# Patient Record
Sex: MALE | Race: OTHER | NOT HISPANIC OR LATINO
[De-identification: names, ages, dates, MRNs, and addresses within clinical notes are randomized per-mention and may not be internally consistent; named-entity substitution may affect disease eponyms.]

---

## 2019-11-09 ENCOUNTER — TRANSCRIPTION ENCOUNTER (OUTPATIENT)
Age: 24
End: 2019-11-09

## 2019-11-12 ENCOUNTER — TRANSCRIPTION ENCOUNTER (OUTPATIENT)
Age: 24
End: 2019-11-12

## 2019-11-12 ENCOUNTER — INPATIENT (INPATIENT)
Facility: HOSPITAL | Age: 24
LOS: 4 days | Discharge: HOME | DRG: 866 | End: 2019-11-17
Attending: INTERNAL MEDICINE | Admitting: HOSPITALIST
Payer: COMMERCIAL

## 2019-11-12 VITALS
DIASTOLIC BLOOD PRESSURE: 68 MMHG | WEIGHT: 177.91 LBS | SYSTOLIC BLOOD PRESSURE: 112 MMHG | RESPIRATION RATE: 14 BRPM | HEART RATE: 84 BPM | TEMPERATURE: 100 F | OXYGEN SATURATION: 98 %

## 2019-11-12 DIAGNOSIS — D72.819 DECREASED WHITE BLOOD CELL COUNT, UNSPECIFIED: ICD-10-CM

## 2019-11-12 DIAGNOSIS — Z29.9 ENCOUNTER FOR PROPHYLACTIC MEASURES, UNSPECIFIED: ICD-10-CM

## 2019-11-12 DIAGNOSIS — D61.818 OTHER PANCYTOPENIA: ICD-10-CM

## 2019-11-12 DIAGNOSIS — K82.9 DISEASE OF GALLBLADDER, UNSPECIFIED: ICD-10-CM

## 2019-11-12 DIAGNOSIS — K52.9 NONINFECTIVE GASTROENTERITIS AND COLITIS, UNSPECIFIED: ICD-10-CM

## 2019-11-12 DIAGNOSIS — D69.6 THROMBOCYTOPENIA, UNSPECIFIED: ICD-10-CM

## 2019-11-12 LAB
ALBUMIN SERPL ELPH-MCNC: 3.6 G/DL — SIGNIFICANT CHANGE UP (ref 3.3–5)
ALP SERPL-CCNC: 72 U/L — SIGNIFICANT CHANGE UP (ref 40–120)
ALT FLD-CCNC: 70 U/L — SIGNIFICANT CHANGE UP (ref 12–78)
ANION GAP SERPL CALC-SCNC: 7 MMOL/L — SIGNIFICANT CHANGE UP (ref 5–17)
APPEARANCE UR: CLEAR — SIGNIFICANT CHANGE UP
APTT BLD: 51.2 SEC — HIGH (ref 28.5–37)
AST SERPL-CCNC: 131 U/L — HIGH (ref 15–37)
BACTERIA # UR AUTO: ABNORMAL
BASOPHILS # BLD AUTO: 0 K/UL — SIGNIFICANT CHANGE UP (ref 0–0.2)
BASOPHILS NFR BLD AUTO: 0 % — SIGNIFICANT CHANGE UP (ref 0–2)
BILIRUB SERPL-MCNC: 0.7 MG/DL — SIGNIFICANT CHANGE UP (ref 0.2–1.2)
BILIRUB UR-MCNC: NEGATIVE — SIGNIFICANT CHANGE UP
BUN SERPL-MCNC: 10 MG/DL — SIGNIFICANT CHANGE UP (ref 7–23)
CALCIUM SERPL-MCNC: 8.5 MG/DL — SIGNIFICANT CHANGE UP (ref 8.5–10.1)
CHLORIDE SERPL-SCNC: 97 MMOL/L — SIGNIFICANT CHANGE UP (ref 96–108)
CO2 SERPL-SCNC: 30 MMOL/L — SIGNIFICANT CHANGE UP (ref 22–31)
COLOR SPEC: YELLOW — SIGNIFICANT CHANGE UP
CREAT SERPL-MCNC: 0.95 MG/DL — SIGNIFICANT CHANGE UP (ref 0.5–1.3)
DIFF PNL FLD: ABNORMAL
EOSINOPHIL # BLD AUTO: 0 K/UL — SIGNIFICANT CHANGE UP (ref 0–0.5)
EOSINOPHIL NFR BLD AUTO: 0 % — SIGNIFICANT CHANGE UP (ref 0–6)
EPI CELLS # UR: SIGNIFICANT CHANGE UP
ETHANOL SERPL-MCNC: <10 MG/DL — SIGNIFICANT CHANGE UP (ref 0–10)
GLUCOSE SERPL-MCNC: 129 MG/DL — HIGH (ref 70–99)
GLUCOSE UR QL: NEGATIVE — SIGNIFICANT CHANGE UP
HCT VFR BLD CALC: 48.1 % — SIGNIFICANT CHANGE UP (ref 39–50)
HCT VFR BLD CALC: 53.7 % — HIGH (ref 39–50)
HGB BLD-MCNC: 16.7 G/DL — SIGNIFICANT CHANGE UP (ref 13–17)
HGB BLD-MCNC: 19.1 G/DL — CRITICAL HIGH (ref 13–17)
INR BLD: 1.08 RATIO — SIGNIFICANT CHANGE UP (ref 0.88–1.16)
KETONES UR-MCNC: NEGATIVE — SIGNIFICANT CHANGE UP
LACTATE SERPL-SCNC: 1.7 MMOL/L — SIGNIFICANT CHANGE UP (ref 0.7–2)
LEUKOCYTE ESTERASE UR-ACNC: NEGATIVE — SIGNIFICANT CHANGE UP
LIDOCAIN IGE QN: 121 U/L — SIGNIFICANT CHANGE UP (ref 73–393)
LYMPHOCYTES # BLD AUTO: 0.48 K/UL — LOW (ref 1–3.3)
LYMPHOCYTES # BLD AUTO: 15.9 % — SIGNIFICANT CHANGE UP (ref 13–44)
MCHC RBC-ENTMCNC: 30.4 PG — SIGNIFICANT CHANGE UP (ref 27–34)
MCHC RBC-ENTMCNC: 30.6 PG — SIGNIFICANT CHANGE UP (ref 27–34)
MCHC RBC-ENTMCNC: 34.7 GM/DL — SIGNIFICANT CHANGE UP (ref 32–36)
MCHC RBC-ENTMCNC: 35.6 GM/DL — SIGNIFICANT CHANGE UP (ref 32–36)
MCV RBC AUTO: 85.9 FL — SIGNIFICANT CHANGE UP (ref 80–100)
MCV RBC AUTO: 87.5 FL — SIGNIFICANT CHANGE UP (ref 80–100)
MONOCYTES # BLD AUTO: 0.19 K/UL — SIGNIFICANT CHANGE UP (ref 0–0.9)
MONOCYTES NFR BLD AUTO: 6.4 % — SIGNIFICANT CHANGE UP (ref 2–14)
NEUTROPHILS # BLD AUTO: 1.52 K/UL — LOW (ref 1.8–7.4)
NEUTROPHILS NFR BLD AUTO: 44.3 % — SIGNIFICANT CHANGE UP (ref 43–77)
NITRITE UR-MCNC: NEGATIVE — SIGNIFICANT CHANGE UP
NRBC # BLD: SIGNIFICANT CHANGE UP /100 WBCS (ref 0–0)
PH UR: 6.5 — SIGNIFICANT CHANGE UP (ref 5–8)
PLATELET # BLD AUTO: 13 K/UL — CRITICAL LOW (ref 150–400)
PLATELET # BLD AUTO: 13 K/UL — CRITICAL LOW (ref 150–400)
POTASSIUM SERPL-MCNC: 4.1 MMOL/L — SIGNIFICANT CHANGE UP (ref 3.5–5.3)
POTASSIUM SERPL-SCNC: 4.1 MMOL/L — SIGNIFICANT CHANGE UP (ref 3.5–5.3)
PROT SERPL-MCNC: 6.9 G/DL — SIGNIFICANT CHANGE UP (ref 6–8.3)
PROT UR-MCNC: 150 MG/DL
PROTHROM AB SERPL-ACNC: 12.2 SEC — SIGNIFICANT CHANGE UP (ref 10–12.9)
RBC # BLD: 5.5 M/UL — SIGNIFICANT CHANGE UP (ref 4.2–5.8)
RBC # BLD: 6.25 M/UL — HIGH (ref 4.2–5.8)
RBC # FLD: 11.7 % — SIGNIFICANT CHANGE UP (ref 10.3–14.5)
RBC # FLD: 11.7 % — SIGNIFICANT CHANGE UP (ref 10.3–14.5)
RBC CASTS # UR COMP ASSIST: SIGNIFICANT CHANGE UP /HPF (ref 0–4)
SODIUM SERPL-SCNC: 134 MMOL/L — LOW (ref 135–145)
SP GR SPEC: 1.01 — SIGNIFICANT CHANGE UP (ref 1.01–1.02)
UROBILINOGEN FLD QL: NEGATIVE — SIGNIFICANT CHANGE UP
WBC # BLD: 2.79 K/UL — LOW (ref 3.8–10.5)
WBC # BLD: 3 K/UL — LOW (ref 3.8–10.5)
WBC # FLD AUTO: 2.79 K/UL — LOW (ref 3.8–10.5)
WBC # FLD AUTO: 3 K/UL — LOW (ref 3.8–10.5)
WBC UR QL: SIGNIFICANT CHANGE UP

## 2019-11-12 PROCEDURE — 99285 EMERGENCY DEPT VISIT HI MDM: CPT

## 2019-11-12 PROCEDURE — 93010 ELECTROCARDIOGRAM REPORT: CPT

## 2019-11-12 PROCEDURE — 70450 CT HEAD/BRAIN W/O DYE: CPT | Mod: 26

## 2019-11-12 PROCEDURE — 99406 BEHAV CHNG SMOKING 3-10 MIN: CPT

## 2019-11-12 PROCEDURE — 99223 1ST HOSP IP/OBS HIGH 75: CPT | Mod: GC

## 2019-11-12 PROCEDURE — 99223 1ST HOSP IP/OBS HIGH 75: CPT

## 2019-11-12 PROCEDURE — 74177 CT ABD & PELVIS W/CONTRAST: CPT | Mod: 26

## 2019-11-12 PROCEDURE — 76705 ECHO EXAM OF ABDOMEN: CPT | Mod: 26

## 2019-11-12 RX ORDER — SENNA PLUS 8.6 MG/1
2 TABLET ORAL AT BEDTIME
Refills: 0 | Status: DISCONTINUED | OUTPATIENT
Start: 2019-11-12 | End: 2019-11-17

## 2019-11-12 RX ORDER — PIPERACILLIN AND TAZOBACTAM 4; .5 G/20ML; G/20ML
3.38 INJECTION, POWDER, LYOPHILIZED, FOR SOLUTION INTRAVENOUS ONCE
Refills: 0 | Status: COMPLETED | OUTPATIENT
Start: 2019-11-12 | End: 2019-11-12

## 2019-11-12 RX ORDER — ACETAMINOPHEN 500 MG
650 TABLET ORAL ONCE
Refills: 0 | Status: COMPLETED | OUTPATIENT
Start: 2019-11-12 | End: 2019-11-12

## 2019-11-12 RX ORDER — SODIUM CHLORIDE 9 MG/ML
1000 INJECTION INTRAMUSCULAR; INTRAVENOUS; SUBCUTANEOUS ONCE
Refills: 0 | Status: COMPLETED | OUTPATIENT
Start: 2019-11-12 | End: 2019-11-12

## 2019-11-12 RX ORDER — ACETAMINOPHEN 500 MG
650 TABLET ORAL EVERY 6 HOURS
Refills: 0 | Status: DISCONTINUED | OUTPATIENT
Start: 2019-11-12 | End: 2019-11-17

## 2019-11-12 RX ORDER — IOHEXOL 300 MG/ML
30 INJECTION, SOLUTION INTRAVENOUS ONCE
Refills: 0 | Status: COMPLETED | OUTPATIENT
Start: 2019-11-12 | End: 2019-11-12

## 2019-11-12 RX ORDER — PANTOPRAZOLE SODIUM 20 MG/1
40 TABLET, DELAYED RELEASE ORAL ONCE
Refills: 0 | Status: COMPLETED | OUTPATIENT
Start: 2019-11-12 | End: 2019-11-12

## 2019-11-12 RX ORDER — PIPERACILLIN AND TAZOBACTAM 4; .5 G/20ML; G/20ML
3.38 INJECTION, POWDER, LYOPHILIZED, FOR SOLUTION INTRAVENOUS EVERY 8 HOURS
Refills: 0 | Status: DISCONTINUED | OUTPATIENT
Start: 2019-11-12 | End: 2019-11-13

## 2019-11-12 RX ORDER — FAMOTIDINE 10 MG/ML
20 INJECTION INTRAVENOUS ONCE
Refills: 0 | Status: COMPLETED | OUTPATIENT
Start: 2019-11-12 | End: 2019-11-12

## 2019-11-12 RX ORDER — ONDANSETRON 8 MG/1
4 TABLET, FILM COATED ORAL ONCE
Refills: 0 | Status: COMPLETED | OUTPATIENT
Start: 2019-11-12 | End: 2019-11-12

## 2019-11-12 RX ADMIN — IOHEXOL 30 MILLILITER(S): 300 INJECTION, SOLUTION INTRAVENOUS at 15:58

## 2019-11-12 RX ADMIN — SODIUM CHLORIDE 1000 MILLILITER(S): 9 INJECTION INTRAMUSCULAR; INTRAVENOUS; SUBCUTANEOUS at 15:20

## 2019-11-12 RX ADMIN — SODIUM CHLORIDE 1000 MILLILITER(S): 9 INJECTION INTRAMUSCULAR; INTRAVENOUS; SUBCUTANEOUS at 15:55

## 2019-11-12 RX ADMIN — ONDANSETRON 4 MILLIGRAM(S): 8 TABLET, FILM COATED ORAL at 15:23

## 2019-11-12 RX ADMIN — Medication 650 MILLIGRAM(S): at 20:51

## 2019-11-12 RX ADMIN — PANTOPRAZOLE SODIUM 40 MILLIGRAM(S): 20 TABLET, DELAYED RELEASE ORAL at 16:34

## 2019-11-12 RX ADMIN — Medication 650 MILLIGRAM(S): at 21:35

## 2019-11-12 RX ADMIN — SODIUM CHLORIDE 1000 MILLILITER(S): 9 INJECTION INTRAMUSCULAR; INTRAVENOUS; SUBCUTANEOUS at 18:00

## 2019-11-12 RX ADMIN — FAMOTIDINE 20 MILLIGRAM(S): 10 INJECTION INTRAVENOUS at 15:23

## 2019-11-12 RX ADMIN — PIPERACILLIN AND TAZOBACTAM 200 GRAM(S): 4; .5 INJECTION, POWDER, LYOPHILIZED, FOR SOLUTION INTRAVENOUS at 20:40

## 2019-11-12 NOTE — H&P ADULT - NSHPPHYSICALEXAM_GEN_ALL_CORE
T(C): 38 (11-12-19 @ 20:25), Max: 38 (11-12-19 @ 20:25)  HR: 76 (11-12-19 @ 20:25) (60 - 84)  BP: 103/69 (11-12-19 @ 20:25) (103/69 - 118/74)  RR: 14 (11-12-19 @ 20:25) (14 - 14)  SpO2: 96% (11-12-19 @ 20:25) (96% - 100%)    GENERAL: patient appears well, no acute distress, appropriate, pleasant  EYES: sclera clear, no exudates  ENMT: oropharynx clear without erythema, no exudates, moist mucous membranes  NECK: supple, soft, no thyromegaly noted  LUNGS: good air entry bilaterally, clear to auscultation, symmetric breath sounds, no wheezing or rhonchi appreciated  HEART: soft S1/S2, regular rate and rhythm, no murmurs noted, no lower extremity edema  GASTROINTESTINAL: abdomen is soft, TTP RUQ, + Snider's sign, nondistended, normoactive bowel sounds, no palpable masses, clustered petechiae over right epigastrium  INTEGUMENT: good skin turgor, petechiae noted on L antecubital fossa and right epigastrium, healing mosquito bites on b/l ankles  MUSCULOSKELETAL: no clubbing or cyanosis, no obvious deformity  NEUROLOGIC: awake, alert, oriented x3, good muscle tone in 4 extremities, no obvious sensory deficits  PSYCHIATRIC: mood is good, affect is congruent, linear and logical thought process  HEME/LYMPH: no palpable cervical/supraclavicular nodules, no obvious ecchymosis or petechiae

## 2019-11-12 NOTE — ED PROVIDER NOTE - OBJECTIVE STATEMENT
Pt is a 25 yo male with no pmhx c/o of abdominal nvd fever for a few days, sent from urgent care. Patient states he had recent travel to [facility] one week ago where he had some diarrhea, on Saturday patient felt fever, chills, tmax 103F, having nausea and vomiting with loose stool, today noted blood streaks in the vomiting no recent sick contacts chest pain sob.

## 2019-11-12 NOTE — H&P ADULT - ATTENDING COMMENTS
I have personally seen and examined patient on the above date.  I discussed the case with MD Isaacs and I agree with findings and plan as detailed per note above, which I have amended where appropriate.

## 2019-11-12 NOTE — CONSULT NOTE ADULT - SUBJECTIVE AND OBJECTIVE BOX
HPI:  24 year old male with no significant PMH or PSH presents to ED complaining of constant 7-9/10 RUQ abdominal pain x 1day, on and off fevers to 104F (max) x 5days, nausea, vomiting after eating 3x yesterday, 1x today with streaks of blood, nonbloody diarrhea 2x yesterday, 1x today, as well as  headache and mild petechial rash on left forearm and chest.  Patient recently returned from a trip to [facility] 9 days ago, family members who were with him w/o complaints, denies sick contacts.  Patient went to urgent care facility 3 days ago, recommended alternating motrin 600mg q6, tylenol 1000mg q6, had negative flu and strep tests.  Patient denies chest pain, shortness of breath, palpitations, cough, wheezing, melena, hematochezia, or urinary complaints.    PAST MEDICAL & SURGICAL HISTORY:  No pertinent past medical history  No significant past surgical history    REVIEW OF SYSTEMS:  CONSTITUTIONAL: See HPI.  EYES/ENT: No visual changes;  No vertigo or throat pain.  NECK: No pain or stiffness  RESPIRATORY: No cough, wheezing, hemoptysis; No shortness of breath  CARDIOVASCULAR: No chest pain or palpitations  GASTROINTESTINAL: See HPI. No constipation. No melena or hematochezia.  GENITOURINARY: No dysuria, frequency or hematuria  NEUROLOGICAL: No numbness or weakness  SKIN: See HPI. No itching, burning, or lesions   All other review of systems is negative unless indicated above.    MEDICATIONS  (STANDING):  multivitamin 1 Tablet(s) Oral daily  piperacillin/tazobactam IVPB.. 3.375 Gram(s) IV Intermittent every 8 hours    MEDICATIONS  (PRN):  acetaminophen   Tablet .. 650 milliGRAM(s) Oral every 6 hours PRN Temp greater or equal to 38C (100.4F), Mild Pain (1 - 3)  senna 2 Tablet(s) Oral at bedtime PRN Constipation    Allergies  No Known Allergies    SOCIAL HISTORY: 1pack/2weeks cigarette smoker.  Social ETOH.  No illicit drug use.    FAMILY HISTORY:  Nonconstributory    Vital Signs Last 24 Hrs  T(C): 37.7 (2019 21:35), Max: 38 (2019 20:25)  T(F): 99.8 (2019 21:35), Max: 100.4 (2019 20:25)  HR: 71 (2019 21:35) (60 - 84)  BP: 102/68 (2019 21:35) (102/68 - 118/74)  BP(mean): --  RR: 14 (2019 21:35) (14 - 14)  SpO2: 96% (2019 21:35) (96% - 100%)    PHYSICAL EXAM  GENERAL:  Well-nourished, well-developed male sitting comfortably in bed in NAD.  HEAD:  Normocephalic, atraumatic  CARDIO:  Regular rate and rhythm.  No murmur, gallop or rub appreciated.  +Petechial rash noted on chest.  RESPIRATORY:  Clear to auscultation bilaterally.  No wheezing, rales or rhonchi appreciated.  ABDOMEN:  Soft, nondistended, +TTP in RUQ, BS appreciated on auscultation.  No rebound tenderness or guarding.  EXTREMITIES: No calf tenderness.  +Petechial rash on left forearm  NEURO:  A&O x 3    LABS:                        16.7   2.79  )-----------( 13       ( 2019 16:57 )             48.1         134<L>  |  97  |  10  ----------------------------<  129<H>  4.1   |  30  |  0.95    Ca    8.5      2019 15:36    TPro  6.9  /  Alb  3.6  /  TBili  0.7  /  DBili  x   /  AST  131<H>  /  ALT  70  /  AlkPhos  72  -12    PT/INR - ( 2019 20:12 )   PT: 12.2 sec;   INR: 1.08 ratio  PTT - ( 2019 20:12 )  PTT:51.2 sec    Urinalysis Basic - ( 2019 16:46 )  Color: Yellow / Appearance: Clear / S.010 / pH: x  Gluc: x / Ketone: Negative  / Bili: Negative / Urobili: Negative   Blood: x / Protein: 150 mg/dL / Nitrite: Negative   Leuk Esterase: Negative / RBC: 0-2 /HPF / WBC 0-2   Sq Epi: x / Non Sq Epi: Occasional / Bacteria: Occasional    RADIOLOGY & ADDITIONAL STUDIES:  < from: CT Abdomen and Pelvis w/ Oral Cont and w/ IV Cont (19 @ 18:33) >  FINDINGS:    The lower chest is unremarkable.    The liver is unremarkable. The fluid-filled gallbladder is remarkable for   severe gallbladder wall thickening.    The spleen, pancreas and adrenal glands are unremarkable.The kidneys are   unremarkable. The fluid-filled bladder appears intact.    There is no bowel obstruction. A normal appendix is seen.    There is no intraperitoneal free air.  There is a small-to-moderate   amount of abdominal and pelvic ascites . The aorta is not aneurysmal.    There is no significant abdominal, retroperitoneal or pelvic   lymphadenopathy. The pelvic structures are unremarkable.    The osseous structures are unremarkable.    IMPRESSION:    Small to moderate amount of ascites  Gallbladder wall thickening which may be secondary to ascites      < from: US Gallbladder (19 @ 15:50) >  Gallbladder is calculi. There is a marked edematous thickening of the   gallbladder wall and pericholecystic fluid. This is a nonspecific finding   may be reactive secondary to liver disease or hypoalbuminemia. If there   is suspicion for cholecystitis, HIDA scan can be obtained. No biliary   dilatation. Common duct 0.3 cm.    Impression:    Nonspecific edematous gallbladder wall and pericholecystic fluid as   discussed.

## 2019-11-12 NOTE — ED PROVIDER NOTE - PROGRESS NOTE DETAILS
labs wbc 7 platelet 13 repeated same results ct and us edematous gallbladder spoke with GI Dr. Hirsch spoke with GI Dr. Sutherland advised abx and heme onc will admit to medicine

## 2019-11-12 NOTE — H&P ADULT - ASSESSMENT
25yo M, with no significant PMH, c/o of recurring fever and abdominal pain a/w diarrhea, nausea, vomiting with streaks of blood, petechiae, myalgia, and arthralgia after returning from [facility], admitted for thrombocytopenia and possible gastroenteritis. 25yo M, with no significant PMH, c/o of recurring fever and abdominal pain a/w diarrhea, nausea, vomiting with streaks of blood, petechiae, myalgia, and arthralgia after returning from [facility], admitted for thrombocytopenia and possible gastroenteritis.    #tobacco abuse  - cessation advised 5 minutes of counselling spent

## 2019-11-12 NOTE — ED ADULT NURSE NOTE - OBJECTIVE STATEMENT
Pt A&Ox4, ambulatory to ED c/o abdominal pain and fever.  Pt states he had a fever this past Sunday and went to urgent care where he had negative flu and strep throat tests and was told to take Tylenol.  Pt states that yesterday he developed nausea and vomiting and now has abdominal pain in mid-abdomen/epigastric area.  Pt returned to urgent care but was sent to ED for eval.

## 2019-11-12 NOTE — H&P ADULT - PROBLEM SELECTOR PLAN 3
- Pt with nausea/vomiting and diarrhea  - Maintain adequate IV hydration  - Start clear liquid diet, advance as tolerated  - Monitor stool output - Pt with nausea/vomiting and diarrhea  - S/p protonix, pepcid, and 1x zofran in the ED  - Continue anti-emetic q8h prn  - S/p 2L NS, maintain adequate IV hydration  - Start clear liquid diet, advance as tolerated  - Monitor stool output  - Monitor fever curve, Tylenol 650mg q6h prn for fever - Pt with nausea/vomiting and diarrhea  - S/p protonix, pepcid, and 1x zofran in the ED  - Continue anti-emetic q8h prn  - S/p 2L NS, maintain adequate IV hydration  - npo w/ ivf  - Monitor stool output  - Monitor fever curve, Tylenol 650mg q6h prn for fever  - gi consult pending

## 2019-11-12 NOTE — ED PROVIDER NOTE - CLINICAL SUMMARY MEDICAL DECISION MAKING FREE TEXT BOX
Pt is a 23 yo male with abdominal pain nvd fever will get labs us ruq ct scan give fluids gi cocktail

## 2019-11-12 NOTE — H&P ADULT - PROBLEM SELECTOR PLAN 2
- monitor vitals and wbc   - may be secondary to dengue vs hus vs other mosquito born illnesses vs travelers diarrhea vs itp vs ttp vs other hematological concerns

## 2019-11-12 NOTE — CONSULT NOTE ADULT - ASSESSMENT
24 year old male with no significant PMH or PSH with RUQ pain, fever (Tmax in ED 104F), diarrhea, hematemesis, likely gastroenteritis.  Leukopenia (2.7) thrombocytopenia (13) noted.    - No emergent surgical intervention at this time  - HIDA ordered.  Possible cholecystostomy  - Keep NPO with IV fluids for now  - Continue IV zosyn  - Trend WBC, LFTs, Tbili  - Monitor and replete electrolytes PRN  - Heme/onc consult for thrombocytopenia/leukopenia  - GI PCR, c. diff, stool culture, ova&parasites, GI consult for management of gastroenteritis  - Pain control, zofran PRN  - DVT prophylaxis with SCDs  - Encourage ambulation  - Case discussed with Dr. Sutherland 24 year old male with no significant PMH or PSH with RUQ pain, fever (Tmax in ED 104F), diarrhea, hematemesis, likely gastroenteritis, r/o vector-borne diseases.  Leukopenia (2.7) thrombocytopenia (13) noted.    - No emergent surgical intervention at this time  - HIDA ordered.  Possible cholecystostomy  - Keep NPO with IV fluids for now  - Continue IV zosyn  - Trend WBC, LFTs, Tbili  - Monitor and replete electrolytes PRN  - Heme/onc consult for thrombocytopenia/leukopenia  - GI PCR, c. diff, stool culture, ova&parasites, GI consult for management of gastroenteritis  - Pain control, zofran PRN  - DVT prophylaxis with SCDs  - Encourage ambulation  - Case discussed with Dr. Sutherland

## 2019-11-12 NOTE — ED ADULT NURSE NOTE - CHPI ED NUR SYMPTOMS POS
VOMITING/DECREASED EATING/DRINKING/NAUSEA/TENDERNESS/FEVER/PAIN VOMITING/DIARRHEA/FEVER/PAIN/TENDERNESS/DECREASED EATING/DRINKING/NAUSEA

## 2019-11-12 NOTE — H&P ADULT - HISTORY OF PRESENT ILLNESS
CHARTING IN PROGRESS    From ED Provider notes:          In the ED  VS T 99.7 now 100.4 /68 now 103/69 HR 84 RR 14 SpO2 96%  Significant labs include: WBC 2.79, Platelets 13, Na 134.  CT head: no evidence of acute intracranial hemorrhage  CT a/p: severe gallbladder thickening  EKG:    Received 2L NS, Protonix, Pepcid, Zofran, 1x Zosyn. CHARTING IN PROGRESS    From ED Provider note:    Pt is a 25 yo male with no pmhx c/o of abdominal nvd fever for a few days, sent from urgent care. Patient states he had recent travel to [facility] one week ago where he had some diarrhea, on Saturday patient felt fever, chills, tmax 103F, having nausea and vomiting with loose stool, today noted blood streaks in the vomiting no recent sick contacts chest pain sob.      In the ED  VS T 99.7 now 100.4 /68 now 103/69 HR 84 RR 14 SpO2 96%  Significant labs include: WBC 2.79, Platelets 13, Na 134.  CT head: no evidence of acute intracranial hemorrhage  CT a/p: severe gallbladder thickening  EKG:    Received 2L NS, Protonix, Pepcid, Zofran, 1x Zosyn. CHARTING IN PROGRESS    From ED Provider note:    Pt is a 23 yo male with no pmhx c/o of abdominal nvd fever for a few days, sent from urgent care. Patient states he had recent travel to [facility] one week ago where he had some diarrhea, on Saturday patient felt fever, chills, tmax 103F, having nausea and vomiting with loose stool, today noted blood streaks in the vomiting no recent sick contacts chest pain sob.      In the ED  VS T 99.7 now 100.4 /68 now 103/69 HR 84 RR 14 SpO2 96%  Significant labs include: WBC 2.79, Platelets 13, Na 134, .  CT head: no evidence of acute intracranial hemorrhage  CT a/p: severe gallbladder thickening  EKG:    Received 2L NS, Protonix, Pepcid, Zofran, 1x Zosyn. 23yo M, with no significant PMh, c/o 5 day duration of fever and abdominal pain that developed yesterday. Pt states he returned from [facility] on 11/2 where he spent nine days. During this time he was bitten by mosquitoes multiple times. He traveled with family, and none of his travel companions are complaining of similar symptoms. Per pt, fevers started on Friday 11/8. Tmax at home 103-104. The following day he visited urgent care who advised alternating 2 tabs of double-strength Tylenol with 3 tabs of 200mg Motrin every few hours. Fever would respond to therapy but always recurred. Yesterday, pt developed abdominal pain mostly in the right upper quadrant that is associated with nausea, vomiting, and nonbloody diarrhea. Today, pt noted streaks of blood in his vomit and has been unable to keep solids and fluids down. Pt states he received pre-travel prophylactic care including MMRV and TB at an urgent care but can't remember if he received anything else. Endorses b/l knee pain, calf pain, scattered rash, but denies chest pain, palpitations, SOB, cough, or urinary symptoms.    In the ED  VS T 99.7 now 100.4 /68 now 103/69 HR 84 RR 14 SpO2 96%  Significant labs include: WBC 2.79, Platelets 13, Na 134, .  CT head: no evidence of acute intracranial hemorrhage  CT a/p: severe gallbladder thickening and mild-mod abdominal and pelvic ascites  US gallbladder: nonspecific edematous gallbladder with pericholecystic fluid    Received 2L NS, Protonix, Pepcid, Zofran, 1x Zosyn, Tylenol 650mg.

## 2019-11-12 NOTE — H&P ADULT - NSHPREVIEWOFSYSTEMS_GEN_ALL_CORE
CONSTITUTIONAL: admits fever, chills, fatigue, weakness  HEENT: denies blurred vision, sore throat  SKIN: admits mild petechial rash  CARDIOVASCULAR: denies chest pain, chest pressure, palpitations  RESPIRATORY: denies shortness of breath, sputum production  GASTROINTESTINAL: admits nausea, vomiting, diarrhea, abdominal pain  GENITOURINARY: denies dysuria, discharge  NEUROLOGICAL: admits headache w/ abd w/o ocular movement; denies numbness/tingling, focal weakness  MUSCULOSKELETAL: admits b/l knee pain, myalgia  HEMATOLOGIC: denies gross bleeding, bruising  LYMPHATICS: denies enlarged lymph nodes, extremity swelling

## 2019-11-12 NOTE — H&P ADULT - PROBLEM SELECTOR PLAN 1
- Severe thromobocytopenia, platelets 13  - CT head with no evidence of spontaneous intracranial hemorrhage  - Consult heme/onc, f/u recs - Severe thrombocytopenia, platelets 13  - Admit to [facility]  - CT head with no evidence of spontaneous intracranial hemorrhage  - Consult heme/onc, f/u recs - Severe thrombocytopenia, platelets 13  - may be secondary to dengue vs hus vs other mosquito born illnesses vs travelers diarrhea vs itp vs ttp vs other hematological concerns   - Admit to [facility]  - CT head with no evidence of spontaneous intracranial hemorrhage  - Consult heme/onc, f/u recs  - hold platelet transfusion for now as no active signs of bleeding   - hold steroid dosing for now as well   - if patient positive for ttp may need ivig vs plasmapheresis

## 2019-11-12 NOTE — ED PROVIDER NOTE - ATTENDING CONTRIBUTION TO CARE
I have personally performed a face to face diagnostic evaluation on this patient.  I have reviewed the PA note and agree with the history, exam, and plan of care, except as noted.  History and Exam by me shows patient sent to ER from urgent care for evaluation of nausea, vomiting, diarrhea and abdominal pain. Patient states he had recent travel to [facility] one week ago where he had some diarrhea, on Saturday patient felt fever, chills, tmax 103F, having nausea and vomiting with loose stool, today noted blood streaks in the vomiting, patient alert and oriented, heart and lungs clear, (+)tender epigatstric and RUQ, no RLQ or LLQ tenderness, f/u labs, iv fluids, anti-emetics, pepcid, protonix, US gallbladder, ct abdomen/pelvis, re-eval.

## 2019-11-12 NOTE — H&P ADULT - NSHPSOCIALHISTORY_GEN_ALL_CORE
Lives at home with wife  Ambulates independently  Performs ADLs independently  Admits cigarette smoking, approx 1 pack over two weeks  Admits occasional social EtOH consumption  Denies rec drug use  Reportedly received this season's flu vaccine

## 2019-11-12 NOTE — H&P ADULT - PROBLEM SELECTOR PLAN 4
- Pt with RUQ pain  - Severe gallbladder thickening and pericholecystic fluid noted on US and CT though may be reactive to liver disease  - AST elevated  - Monitor daily liver function - Pt with RUQ pain  - Severe gallbladder thickening and pericholecystic fluid noted on US and CT though may be reactive to liver disease  - S/p 1x Zosyn in the ED  - AST elevated, monitor daily liver function - Pt with RUQ pain  - Severe gallbladder thickening and pericholecystic fluid noted on US and CT though may be reactive to liver disease  - surgery consult pending   - npo  - S/p 1x Zosyn in the ED- continue  - AST elevated, monitor daily liver function

## 2019-11-12 NOTE — H&P ADULT - PROBLEM SELECTOR PLAN 5
IMPROVE VTE Individual Risk Assessment          RISK                                                          Points  [  ] Previous VTE                                                3  [  ] Thrombophilia                                             2  [  ] Lower limb paralysis                                   2        (unable to hold up >15 seconds)    [  ] Current Cancer                                             2         (within 6 months)  [  ] Immobilization > 24 hrs                              1  [  ] ICU/CCU stay > 24 hours                             1  [  ] Age > 60                                                         1    IMPROVE VTE Score: 0    - SCDs, ambulate as tolerated, no pharm ppx in setting of thrombocytopenia IMPROVE VTE Individual Risk Assessment        RISK                                                          Points  [  ] Previous VTE                                                3  [  ] Thrombophilia                                             2  [  ] Lower limb paralysis                                   2        (unable to hold up >15 seconds)    [  ] Current Cancer                                             2         (within 6 months)  [  ] Immobilization > 24 hrs                              1  [  ] ICU/CCU stay > 24 hours                             1  [  ] Age > 60                                                         1    IMPROVE VTE Score: 0    - SCDs, ambulate as tolerated, no pharm ppx in setting of thrombocytopenia

## 2019-11-12 NOTE — ED PROVIDER NOTE - CARE PLAN
Principal Discharge DX:	Gastroenteritis Principal Discharge DX:	Pancytopenia  Secondary Diagnosis:	Gastroenteritis  Secondary Diagnosis:	Gallbladder problem

## 2019-11-13 LAB
ALBUMIN SERPL ELPH-MCNC: 3 G/DL — LOW (ref 3.3–5)
ALP SERPL-CCNC: 60 U/L — SIGNIFICANT CHANGE UP (ref 40–120)
ALT FLD-CCNC: 67 U/L — SIGNIFICANT CHANGE UP (ref 12–78)
ANION GAP SERPL CALC-SCNC: 5 MMOL/L — SIGNIFICANT CHANGE UP (ref 5–17)
APAP SERPL-MCNC: 3 UG/ML — LOW (ref 10–30)
AST SERPL-CCNC: 110 U/L — HIGH (ref 15–37)
BASOPHILS # BLD AUTO: 0 K/UL — SIGNIFICANT CHANGE UP (ref 0–0.2)
BASOPHILS NFR BLD AUTO: 0 % — SIGNIFICANT CHANGE UP (ref 0–2)
BILIRUB SERPL-MCNC: 0.7 MG/DL — SIGNIFICANT CHANGE UP (ref 0.2–1.2)
BUN SERPL-MCNC: 8 MG/DL — SIGNIFICANT CHANGE UP (ref 7–23)
C DIFF BY PCR RESULT: SIGNIFICANT CHANGE UP
C DIFF TOX GENS STL QL NAA+PROBE: SIGNIFICANT CHANGE UP
CALCIUM SERPL-MCNC: 8.1 MG/DL — LOW (ref 8.5–10.1)
CHLORIDE SERPL-SCNC: 102 MMOL/L — SIGNIFICANT CHANGE UP (ref 96–108)
CO2 SERPL-SCNC: 31 MMOL/L — SIGNIFICANT CHANGE UP (ref 22–31)
CREAT SERPL-MCNC: 0.99 MG/DL — SIGNIFICANT CHANGE UP (ref 0.5–1.3)
CULTURE RESULTS: SIGNIFICANT CHANGE UP
EOSINOPHIL # BLD AUTO: 0.04 K/UL — SIGNIFICANT CHANGE UP (ref 0–0.5)
EOSINOPHIL NFR BLD AUTO: 1 % — SIGNIFICANT CHANGE UP (ref 0–6)
FOLATE SERPL-MCNC: >20 NG/ML — SIGNIFICANT CHANGE UP
GLUCOSE SERPL-MCNC: 93 MG/DL — SIGNIFICANT CHANGE UP (ref 70–99)
HAV IGG SER QL IA: REACTIVE
HAV IGM SER-ACNC: SIGNIFICANT CHANGE UP
HCT VFR BLD CALC: 46.4 % — SIGNIFICANT CHANGE UP (ref 39–50)
HGB BLD-MCNC: 16.4 G/DL — SIGNIFICANT CHANGE UP (ref 13–17)
LYMPHOCYTES # BLD AUTO: 2.67 K/UL — SIGNIFICANT CHANGE UP (ref 1–3.3)
LYMPHOCYTES # BLD AUTO: 65 % — HIGH (ref 13–44)
MCHC RBC-ENTMCNC: 30.7 PG — SIGNIFICANT CHANGE UP (ref 27–34)
MCHC RBC-ENTMCNC: 35.3 GM/DL — SIGNIFICANT CHANGE UP (ref 32–36)
MCV RBC AUTO: 86.7 FL — SIGNIFICANT CHANGE UP (ref 80–100)
MONOCYTES # BLD AUTO: 0.66 K/UL — SIGNIFICANT CHANGE UP (ref 0–0.9)
MONOCYTES NFR BLD AUTO: 16 % — HIGH (ref 2–14)
NEUTROPHILS # BLD AUTO: 0.7 K/UL — LOW (ref 1.8–7.4)
NEUTROPHILS NFR BLD AUTO: 13 % — LOW (ref 43–77)
NRBC # BLD: SIGNIFICANT CHANGE UP /100 WBCS (ref 0–0)
PCP SPEC-MCNC: SIGNIFICANT CHANGE UP
PLATELET # BLD AUTO: 15 K/UL — CRITICAL LOW (ref 150–400)
POTASSIUM SERPL-MCNC: 4.3 MMOL/L — SIGNIFICANT CHANGE UP (ref 3.5–5.3)
POTASSIUM SERPL-SCNC: 4.3 MMOL/L — SIGNIFICANT CHANGE UP (ref 3.5–5.3)
PROT SERPL-MCNC: 5.7 G/DL — LOW (ref 6–8.3)
RBC # BLD: 5.35 M/UL — SIGNIFICANT CHANGE UP (ref 4.2–5.8)
RBC # FLD: 11.8 % — SIGNIFICANT CHANGE UP (ref 10.3–14.5)
SODIUM SERPL-SCNC: 138 MMOL/L — SIGNIFICANT CHANGE UP (ref 135–145)
SPECIMEN SOURCE: SIGNIFICANT CHANGE UP
VIT B12 SERPL-MCNC: 512 PG/ML — SIGNIFICANT CHANGE UP (ref 232–1245)
WBC # BLD: 4.1 K/UL — SIGNIFICANT CHANGE UP (ref 3.8–10.5)
WBC # FLD AUTO: 4.1 K/UL — SIGNIFICANT CHANGE UP (ref 3.8–10.5)

## 2019-11-13 PROCEDURE — 78226 HEPATOBILIARY SYSTEM IMAGING: CPT | Mod: 26

## 2019-11-13 PROCEDURE — 99232 SBSQ HOSP IP/OBS MODERATE 35: CPT

## 2019-11-13 PROCEDURE — 99233 SBSQ HOSP IP/OBS HIGH 50: CPT

## 2019-11-13 RX ORDER — INFLUENZA VIRUS VACCINE 15; 15; 15; 15 UG/.5ML; UG/.5ML; UG/.5ML; UG/.5ML
0.5 SUSPENSION INTRAMUSCULAR ONCE
Refills: 0 | Status: DISCONTINUED | OUTPATIENT
Start: 2019-11-13 | End: 2019-11-17

## 2019-11-13 RX ORDER — CEFTRIAXONE 500 MG/1
2000 INJECTION, POWDER, FOR SOLUTION INTRAMUSCULAR; INTRAVENOUS EVERY 24 HOURS
Refills: 0 | Status: DISCONTINUED | OUTPATIENT
Start: 2019-11-13 | End: 2019-11-17

## 2019-11-13 RX ORDER — SODIUM CHLORIDE 9 MG/ML
1000 INJECTION, SOLUTION INTRAVENOUS
Refills: 0 | Status: DISCONTINUED | OUTPATIENT
Start: 2019-11-13 | End: 2019-11-14

## 2019-11-13 RX ORDER — PREGABALIN 225 MG/1
1000 CAPSULE ORAL DAILY
Refills: 0 | Status: DISCONTINUED | OUTPATIENT
Start: 2019-11-13 | End: 2019-11-17

## 2019-11-13 RX ORDER — SODIUM CHLORIDE 9 MG/ML
2000 INJECTION, SOLUTION INTRAVENOUS
Refills: 0 | Status: DISCONTINUED | OUTPATIENT
Start: 2019-11-13 | End: 2019-11-13

## 2019-11-13 RX ORDER — ONDANSETRON 8 MG/1
4 TABLET, FILM COATED ORAL EVERY 8 HOURS
Refills: 0 | Status: DISCONTINUED | OUTPATIENT
Start: 2019-11-13 | End: 2019-11-17

## 2019-11-13 RX ADMIN — SODIUM CHLORIDE 150 MILLILITER(S): 9 INJECTION, SOLUTION INTRAVENOUS at 10:56

## 2019-11-13 RX ADMIN — PIPERACILLIN AND TAZOBACTAM 25 GRAM(S): 4; .5 INJECTION, POWDER, LYOPHILIZED, FOR SOLUTION INTRAVENOUS at 05:22

## 2019-11-13 RX ADMIN — CEFTRIAXONE 100 MILLIGRAM(S): 500 INJECTION, POWDER, FOR SOLUTION INTRAMUSCULAR; INTRAVENOUS at 14:18

## 2019-11-13 RX ADMIN — Medication 1 TABLET(S): at 12:48

## 2019-11-13 RX ADMIN — PREGABALIN 1000 MICROGRAM(S): 225 CAPSULE ORAL at 12:48

## 2019-11-13 NOTE — ED ADULT NURSE REASSESSMENT NOTE - NS ED NURSE REASSESS COMMENT FT1
Dr. Elias in to speak with pt re: test results and need for admission
Patient resting comfortably in bed with family member at bedside. VSS. Rates RUQ abdominal pain 4/10 at this time. Assisted patient with basic daily hygiene. Patient remains on contact precautions to r/o C.diff at this time. Call bell at bedside.  RN will continue to monitor.
Patient transported to [facility] via wheelchair.
patient's IV on the left antecubital removed as patient complained of pain at the IV site and wanted it removed
Report received from Fallon MOREIRA, care assumed at 1100. Patient awake, alert and oriented. Patient states he still has abdominal discomfort. States he is still having diarrhea. PIV intact. Plan of care discussed with patient. Comfort and safety maintained. Will continue to monitor.  Contact isolation maintained.

## 2019-11-13 NOTE — PATIENT PROFILE ADULT - STATED REASON FOR ADMISSION
"Had fever since friday then I started to have abdominal pain and I went to urgent care this morning they told me I had to come to ER because I need ultrasound and CT. My abdomen was swollen"

## 2019-11-13 NOTE — PROGRESS NOTE ADULT - SUBJECTIVE AND OBJECTIVE BOX
Patient is a 24y old  Male who presents with a chief complaint of fevers (2019 11:33)      INTERVAL HPI: Pt seen and examined bedside, has no complaints. Pt has no fever, N/v CP or SOB. has epigastric tenderness.  OVERNIGHT EVENTS:  T(C): 37.1 (19 @ 16:10), Max: 38 (19 @ 20:25)  HR: 63 (19 @ 16:10) (63 - 76)  BP: 96/63 (19 @ 16:10) (96/63 - 112/58)  RR: 16 (19 @ 16:10) (14 - 16)  SpO2: 100% (19 @ 16:10) (95% - 100%)  Wt(kg): --  I&O's Summary    2019 07:01  -  2019 07:00  --------------------------------------------------------  IN: 2000 mL / OUT: 0 mL / NET: 2000 mL        Review of System:    REVIEW OF SYSTEMS  Constitutional: No fever, chills, fatigue  Neuro: No headache, numbness, weakness  Resp: No cough, wheezing, shortness of breath  CVS: No chest pain, palpitations, leg swelling  GI: +epigastric pain, no nausea, vomiting, diarrhea   : No dysuria, frequency, incontinence  Skin: No itching, burning, rashes, or lesions   Msk: No joint pain or swelling  Psych: No depression, anxiety, mood swings      PHYSICAL EXAM:  GENERAL: NAD, well-groomed, well-developed  HEAD:  Atraumatic, Normocephalic  EYES: EOMI, PERRLA, conjunctiva and sclera clear  ENMT:  Moist mucous membranes  NECK: Supple, No JVD, Normal thyroid  HEART: Regular rate and rhythm; No murmurs, rubs, or gallops  RESPIRATORY: CTA B/L, No wheezing/ rhonchi  ABDOMEN: Soft, Nontender, Nondistended; Bowel sounds present  NEUROLOGY: A&Ox3, nonfocal, CN II-XII grossly intact, sensation intact  EXTREMITIES:  2+ Peripheral Pulses, No clubbing, cyanosis, or edema  SKIN: warm, dry, normal color, no rash or abnormal lesions        LABS:                        16.4   4.10  )-----------( 15       ( 2019 04:19 )             46.4         138  |  102  |  8   ----------------------------<  93  4.3   |  31  |  0.99    Ca    8.1<L>      2019 04:19    TPro  5.7<L>  /  Alb  3.0<L>  /  TBili  0.7  /  DBili  x   /  AST  110<H>  /  ALT  67  /  AlkPhos  60      PT/INR - ( 2019 20:12 )   PT: 12.2 sec;   INR: 1.08 ratio         PTT - ( 2019 20:12 )  PTT:51.2 sec  Urinalysis Basic - ( 2019 16:46 )    Color: Yellow / Appearance: Clear / S.010 / pH: x  Gluc: x / Ketone: Negative  / Bili: Negative / Urobili: Negative   Blood: x / Protein: 150 mg/dL / Nitrite: Negative   Leuk Esterase: Negative / RBC: 0-2 /HPF / WBC 0-2   Sq Epi: x / Non Sq Epi: Occasional / Bacteria: Occasional      CAPILLARY BLOOD GLUCOSE           @ 11:56   Enteropathogenic E. coli (EPEC)  DETECTED by PCR  *******Please Note:*******  GI panel PCR evaluates for:  Campylobacter, Plesiomonas shigelloides, Salmonella,  Vibrio, Yersinia enterocolitica, Enteroaggregative  Escherichia coli (EAEC), Enteropathogenic E.coli (EPEC),  Enterotoxigenic E. coli (ETEC) lt/st, Shiga-like  toxin-producing E. coli (STEC) stx1/stx2,  Shigella/ Enteroinvasive E. coli (EIEC), Cryptosporidium,  Cyclospora cayetanensis, Entamoeba histolytica,  Giardia lamblia, AdenovirusF 40/41, Astrovirus,  Norovirus GI/GII, Rotavirus A, Sapovirus  --  --          Diet:    RADIOLOGY & ADDITIONAL TESTS:    Imaging Personally Reviewed:  [x] YES  [ ] NO    Consultant(s) Notes Reviewed:  [x ] YES  [ ] NO    MEDICATIONS  (STANDING):  cefTRIAXone   IVPB 2000 milliGRAM(s) IV Intermittent every 24 hours  cyanocobalamin 1000 MICROGram(s) Oral daily  dextrose 5% + sodium chloride 0.45%. 2000 milliLiter(s) (150 mL/Hr) IV Continuous <Continuous>  influenza   Vaccine 0.5 milliLiter(s) IntraMuscular once  multivitamin 1 Tablet(s) Oral daily    MEDICATIONS  (PRN):  acetaminophen   Tablet .. 650 milliGRAM(s) Oral every 6 hours PRN Temp greater or equal to 38C (100.4F), Mild Pain (1 - 3)  ondansetron Injectable 4 milliGRAM(s) IV Push every 8 hours PRN Nausea and/or Vomiting  senna 2 Tablet(s) Oral at bedtime PRN Constipation        Disposition:    DVT Prophylaxis:  Subcu Heparin [  ]     LMWH [ ]     Coumadin [ ]    Xaeralto [ ]    Eliquis [ ]   Venodyne pumps [x ]    Discussed with Patient [ ]     Family [ ]          [ ]   RN[ ]      [ ]    Advance Directives:      Palliative Care:    Care Discussed with Consultants/Other Providers [x ] YES  [ ] NO

## 2019-11-13 NOTE — PROGRESS NOTE ADULT - PROBLEM SELECTOR PLAN 1
- Severe thrombocytopenia, and leucopenia  suspect  secondary to dengue vs some viral illness. C/w supportive Rx.  CT head with no evidence of spontaneous intracranial hemorrhage  hem/onc, and ID Consult noted  - hold platelet transfusion for now as no active signs of bleeding

## 2019-11-13 NOTE — PROGRESS NOTE ADULT - ASSESSMENT
25yo M, with no significant PMH, c/o of recurring fever and abdominal pain a/w diarrhea, nausea, vomiting with streaks of blood, petechiae, myalgia, and arthralgia after returning from [facility], admitted for thrombocytopenia and possible gastroenteritis.    #tobacco abuse  - cessation advised 5 minutes of counselling spent

## 2019-11-13 NOTE — PROGRESS NOTE ADULT - ASSESSMENT
Pt seen and examined.  RUQ pain, states improved.  Remains with neutropenia and thrombocytopenia. LFT's normal.  Edematous GB on Sono and CT.  HIDA negative for obstruction.  No signs of acute cholecystitis.  No acute surgical intervention indicated.  Continue management as per ID and Hematology.

## 2019-11-13 NOTE — CONSULT NOTE ADULT - SUBJECTIVE AND OBJECTIVE BOX
[facility], Division of Infectious Diseases  JESUS MANUEL Reeves A. Lee RUBIO, LEATHA  24y, Male  [id number]    HPI--  24M presents to hospital with  fevers, chills, bone pain, headache, nonbloody diarrhea, and petechial rash. These began on ~-8 after returning from a trip to [facility] on 11/3. Patient also developed midepigastric/ discomfort but this began about 2 days after starting large doses of motrin (600 q6) along with tylenol.     Patient went to [facility] for his mother-in-law's . He went to a small town where he was bitten by mosquitoes. He ate local food and drank the water, staying with relatives. No sick contacts. No similar episodes.    Here due to abdominal pain CT and US were performed which were concerning for potential cholecystitis, but HIDA scan was normal.    Labs here significant for leukopenia and thromobocytopenia, isolated elevation in AST.     He was initially treated with Zosyn here in te ED. He had a cut on his finger about a month or two ago for which he received antibiotics.     PMH/PSH--  No pertinent past medical history  No significant past surgical history      Allergies-- denies.       Medications--  Antibiotics: cefTRIAXone   IVPB 2000 milliGRAM(s) IV Intermittent every 24 hours    Immunologic: influenza   Vaccine 0.5 milliLiter(s) IntraMuscular once    Other: acetaminophen   Tablet .. PRN  cyanocobalamin  dextrose 5% + sodium chloride 0.45%.  multivitamin  ondansetron Injectable PRN  senna PRN      Social History--  EtOH: rare  Tobacco: denies   Drug Use: denies     Family/Marital History--    Not relevant to clinical concern.    Travel/Environmental/Occupational History:  As above  Works as a   Came to [place] age 2    Review of Systems:  A >=10-point review of systems was obtained.   Review of systems otherwise negative except as previously noted.    Physical Exam--  Vital Signs: T(F): 98.3 (19 @ 12:52), Max: 100.4 (19 @ 20:25)  HR: 63 (19 @ 12:52)  BP: 105/64 (19 @ 12:52)  RR: 16 (19 @ 12:52)  SpO2: 98% (19 @ 12:52)  Wt(kg): --  General: Nontoxic-appearing Male in no acute distress.  HEENT: AT/NC. PERRL. EOMI. Anicteric. Conjunctiva pink and moist. Oropharynx clear. Dentition good.  Neck: Not rigid. No sense of mass.  Nodes: None palpable.  Lungs: Clear bilaterally without rales, wheezing or rhonchi  Heart: Regular rate and rhythm. No Murmur. No rub. No gallop. No palpable thrill.  Abdomen: Bowel sounds present and normoactive. Soft. Nondistended. Mild midepigastric tenderness without guarding or rebound. No sense of mass. No organomegaly.  Back: No spinal tenderness. No costovertebral angle tenderness.   Extremities: No cyanosis or clubbing. No edema.   Skin: Warm. Dry. Good turgor. Diffuse petechial rash. Lesions on distal LE which patient states are healing mosquito bites. No vasculitic stigmata.  Psychiatric: Appropriate affect and mood for situation.       Laboratory & Imaging Data--  CBC                        16.4   4.10  )-----------( 15       ( 2019 04:19 )             46.4       Chemistries      138  |  102  |  8   ----------------------------<  93  4.3   |  31  |  0.99    Ca    8.1<L>      2019 04:19    TPro  5.7<L>  /  Alb  3.0<L>  /  TBili  0.7  /  DBili  x   /  AST  110<H>  /  ALT  67  /  AlkPhos  60      Tox screen +THC    Clostridium difficile Toxin by PCR (19 @ 10:40)    Clostridium difficile Toxin by PCR: The results of this test should be interpreted with consideration of all  clinical and laboratory findings. This test determines the presence of  the C. difficile tcdB gene at a given time and is not intended to  identify antibiotic associated disease or C. difficile infection without  clinical context.  Successful treatment is based on the resolution of clinical symptoms.  This test should not be used as a "test of cure" because C. difficile DNA  will persist after successful treatment. Repeat testing will not be  permitted.    This test is performed on the BD MAX system using Real-Time PCR and  fluorogenic target-specific hybridization.    C Diff by PCR Result: [facility]      Culture Data  None    < from: CT Abdomen and Pelvis w/ Oral Cont and w/ IV Cont (19 @ 18:33) >  EXAM:  CT ABDOMEN AND PELVIS OC IC                        PROCEDURE DATE:  2019    INTERPRETATION:  CLINICAL STATEMENT: abdominal pain, vomiting blood,   recent travel to [facility]    TECHNIQUE: CT of the abdomen and pelvis was performed with IV contrast.   Oral contrast was administered. Approximately 100 cc of Omnipaque 350   administered.    COMPARISON: None.    FINDINGS:    The lower chest is unremarkable.    The liver is unremarkable. The fluid-filled gallbladder is remarkable for   severe gallbladder wall thickening.    The spleen, pancreas and adrenal glands are unremarkable.The kidneys are   unremarkable. The fluid-filled bladder appears intact.    There is no bowel obstruction. A normal appendix is seen.    There is no intraperitoneal free air.  There is a small-to-moderate   amount of abdominal and pelvic ascites . The aorta is not aneurysmal.    There is no significant abdominal, retroperitoneal or pelvic   lymphadenopathy. The pelvic structures are unremarkable.    The osseous structures are unremarkable.    IMPRESSION:    Small to moderate amount of ascites  Gallbladder wall thickening which may be secondary to ascites  Recommend clinical correlation as to etiology as    CANDY YEE M.D.,ATTENDING RADIOLOGIST  This document has been electronically signed. 2019  7:20PM    < end of copied text >    < from: CT Head No Cont (19 @ 20:13) >  EXAM:  CT BRAIN                        PROCEDURE DATE:  2019    INTERPRETATION:  Exam Date: 2019 8:13 PM  CT head without IV contrast    CLINICAL INFORMATION:  headache, low platetes ADMDIAG1: D61.818 OTHER   PANCYTOPENIA/    TECHNIQUE: Contiguous axial sections were obtained through the head.     This scan was performed using automatic exposure control (radiation dose   reduction software) to obtain a diagnostic image quality scan with   patient dose as low as reasonably achievable.      COMPARISON: No previous examinations are available for review.   FINDINGS:     There is no evidence of intraparenchymal or extraaxial hemorrhage. There   is contrast within the intracranial circulation from prior CT IV   contrast. There is no CT evidence of large vessel acute infarct. No mass   effect is found in the brain.  No evidence of midline shift or herniation   pattern.    The ventricles, sulci and basal cisterns appear unremarkable.    Visualized paranasal sinuses are clear.      IMPRESSION:     No acute intracranial findings.  EMA MANCILLA M.D., ATTENDING RADIOLOGIST  This document has been electronically signed. 2019  8:16PM  < end of copied text >    < from: US Gallbladder (19 @ 15:50) >  INTERPRETATION:  History: Right upper quadrant pain    Gallbladder ultrasound    Gallbladder is calculi. There is a marked edematous thickening of the   gallbladder wall and pericholecystic fluid. This is a nonspecific finding   may be reactive secondary to liver disease or hypoalbuminemia. If there   is suspicion for cholecystitis, HIDA scan can be obtained. No biliary   dilatation. Common duct 0.3 cm.    Impression:    Nonspecific edematous gallbladder wall and pericholecystic fluid as   discussed.    < end of copied text >      < from: NM Hepatobiliary Imaging (19 @ 09:46) >    IMPRESSION: Normal hepatobiliary scan.    No evidence of acute cholecystitis.    < end of copied text >

## 2019-11-13 NOTE — SBIRT NOTE ADULT - NSSBIRTALCNOACTINTDET_GEN_A_CORE
Pt reported that he uses ETOH socially and does not have a substance use issue. SW to remain available for any needs.

## 2019-11-13 NOTE — CONSULT NOTE ADULT - PROBLEM SELECTOR RECOMMENDATION 2
HIDA scan negative  unclear as to why patient developed ascites  will d/w IR re: feasibility of paracentesis given paucity of fluid

## 2019-11-13 NOTE — PROGRESS NOTE ADULT - PROBLEM SELECTOR PLAN 2
suspect secondary to dengue vs viral illness.  treatment as above  Hem/onc consult noted. c/w folate and B12

## 2019-11-13 NOTE — CONSULT NOTE ADULT - ASSESSMENT
Presentation most suggestive of Dengue  Joint complaints not prominent, doubt Chikungunya  Zika previously reported in [facility] but per CDC not active there presently  Typhoid possible but cytopenias seem out of proportion  No anemia to suggest Malaria  Suspect midepigastric discomfort related to motrin/gastritis    Suggestions--  Stool PCR  Stool O&P  Continue Ceftriaxone  Serial exams  Trend fever curve  F/U Blood cx data    D/W patient and his wife.    Thank you for the courtesy of this referral.  Tripp Peterson MD  [phone]

## 2019-11-13 NOTE — PROGRESS NOTE ADULT - SUBJECTIVE AND OBJECTIVE BOX
Vital Signs Last 24 Hrs  T(C): 37.1 (13 Nov 2019 16:10), Max: 38 (12 Nov 2019 20:25)  T(F): 98.8 (13 Nov 2019 16:10), Max: 100.4 (12 Nov 2019 20:25)  HR: 63 (13 Nov 2019 16:10) (63 - 76)  BP: 96/63 (13 Nov 2019 16:10) (96/63 - 112/58)  BP(mean): --  RR: 16 (13 Nov 2019 16:10) (14 - 16)  SpO2: 100% (13 Nov 2019 16:10) (95% - 100%)    11-12 @ 07:01  -  11-13 @ 07:00  --------------------------------------------------------  IN: 2000 mL / OUT: 0 mL / NET: 2000 mL                              16.4   4.10  )-----------( 15       ( 13 Nov 2019 04:19 )             46.4                         16.7   2.79  )-----------( 13       ( 12 Nov 2019 16:57 )             48.1                         19.1   3.00  )-----------( 13       ( 12 Nov 2019 15:36 )             53.7       11-13    138  |  102  |  8   ----------------------------<  93  4.3   |  31  |  0.99    Ca    8.1<L>      13 Nov 2019 04:19    TPro  5.7<L>  /  Alb  3.0<L>  /  TBili  0.7  /  DBili  x   /  AST  110<H>  /  ALT  67  /  AlkPhos  60  11-13      Physical Exam:  Awake alert and oriented x3 in no acute distress. NCAT, PERRLA, EOMI  Lungs clear bilaterally without wheezes rhonchi or rales.  Regular rate and rhythm  Abdomen soft, mild RUQ tender, nondistended, positive bowel sounds in all 4 quadrants. No evidence of hernia or masses.  Extremities without edema or tenderness.

## 2019-11-13 NOTE — CONSULT NOTE ADULT - ASSESSMENT
[ASSESSMENT and  PLAN]  Febrile illness /sp travel to [facility] 1wk prior.   Leukopenia and thrombocytopenia with improvement in leukopenia.   Plts remain low at 15.     No evidence for bleeding.     RECOMMENDATIONS  Transfuse PRBC as clinically indicated.   Transfuse PRBC if Hgb <7.0 or if symptomatic.   Follow CBC    Check Anemia studies.     DVT Prophylaxis    Thank you for consulting us.     I have discussed the above plan of care with patient/family in detail. They expressed understanding of the treatment plan . Risks, benefits and alternatives discussed in detail. I have asked if they have any questions or concerns and appropriately addressed them; all questions answered to their satisfactions and in lay terms. [ASSESSMENT and  PLAN]  Febrile illness /sp travel to [facility] 1wk prior. Suspect infectious causes to pt sx and lab abn.   Leukopenia and thrombocytopenia with improvement in leukopenia since admission. .   Plts remain low at 15.     No evidence for bleeding.   No evidence for hemolysis, nor MAHA picture.   ITP is a consideration, but given fevers, and concern for infection will ask ID to see pt.     Dehydration with hemoconcentration due to diarrhea.       RECOMMENDATIONS  Follow CBC    Check Folate.   ID consultation  Eval for infectious etiology.   Empiric folate and B12 supplementation. Low down side.     IVF  encourage PO fluids.     DVT Prophylaxis: OOB. SCD.     Thank you for consulting us.   I have discussed the above plan of care with patient in detail. They expressed understanding of the treatment plan . Risks, benefits and alternatives discussed in detail. I have asked if they have any questions or concerns and appropriately addressed them; all questions answered to their satisfactions and in lay terms.

## 2019-11-13 NOTE — CONSULT NOTE ADULT - PROBLEM SELECTOR RECOMMENDATION 9
on empiric antibiotics  ID eval pending  await gi pcr  c diff pcr testing however doubt  ? coffee ground emesis in setting of severe thrombocytopenia  IV PPI bid  advance to clears

## 2019-11-13 NOTE — CONSULT NOTE ADULT - SUBJECTIVE AND OBJECTIVE BOX
Patient is a 24y old  Male who presents with a chief complaint of fevers (2019 22:38)      HPI:  23yo M, with no significant PMh, c/o 5 day duration of fever and abdominal pain that developed yesterday. Pt states he returned from [facility] on  where he spent nine days. During this time he was bitten by mosquitoes multiple times. He traveled with family, and none of his travel companions are complaining of similar symptoms. Per pt, fevers started on . Tmax at home 103-104. The following day he visited urgent care who advised alternating 2 tabs of double-strength Tylenol with 3 tabs of 200mg Motrin every few hours. Fever would respond to therapy but always recurred. Yesterday, pt developed abdominal pain mostly in the right upper quadrant that is associated with nausea, vomiting, and nonbloody diarrhea. Today, pt noted streaks of blood in his vomit and has been unable to keep solids and fluids down. Pt states he received pre-travel prophylactic care including MMRV and TB at an urgent care but can't remember if he received anything else. Endorses b/l knee pain, calf pain, scattered rash, but denies chest pain, palpitations, SOB, cough, or urinary symptoms.    In the ED  VS T 99.7 now 100.4 /68 now 103/69 HR 84 RR 14 SpO2 96%  Significant labs include: WBC 2.79, Platelets 13, Na 134, .  CT head: no evidence of acute intracranial hemorrhage  CT a/p: severe gallbladder thickening and mild-mod abdominal and pelvic ascites  US gallbladder: nonspecific edematous gallbladder with pericholecystic fluid    Received 2L NS, Protonix, Pepcid, Zofran, 1x Zosyn, Tylenol 650mg. (2019 20:45)    Heme asked to see pt for leukopenia and  thrombocytoepnia.   [facility], [place] 1[place].       PAST MEDICAL & SURGICAL HISTORY:  No pertinent past medical history  No significant past surgical history      HEALTH ISSUES - PROBLEM Dx:  Need for prophylactic measure: Need for prophylactic measure  Gallbladder disease: Gallbladder disease  Gastroenteritis: Gastroenteritis  Leukopenia: Leukopenia  Thrombocytopenia: Thrombocytopenia          Other pancytopenia (D61.818)  No pertinent past medical history ([id number])  No significant past surgical history ([id number])  Gallbladder problem (K82.9)  Gastroenteritis (K52.9)      FAMILY HISTORY:  No pertinent family history in first degree relatives        [SOCIAL HISTORY: ]     smoking:       EtOH:       illicit drugs:       occupation:       marital status:       Other:       [ALLERGIES/INTOLERANCES:]  Allergies    No Known Allergies    Intolerances          [MEDICATIONS]  MEDICATIONS  (STANDING):  influenza   Vaccine 0.5 milliLiter(s) IntraMuscular once  multivitamin 1 Tablet(s) Oral daily  piperacillin/tazobactam IVPB.. 3.375 Gram(s) IV Intermittent every 8 hours    MEDICATIONS  (PRN):  acetaminophen   Tablet .. 650 milliGRAM(s) Oral every 6 hours PRN Temp greater or equal to 38C (100.4F), Mild Pain (1 - 3)  ondansetron Injectable 4 milliGRAM(s) IV Push every 8 hours PRN Nausea and/or Vomiting  senna 2 Tablet(s) Oral at bedtime PRN Constipation        [REVIEW OF SYSTEMS: ]  CONSTITUTIONAL: normal, no fever, no shakes, no chills   EYES: No eye pain, no visual disturbances, no discharge  ENMT:  no discharge  NECK: No pain, no stiffness  BREASTS: No pain, no masses, no nipple discharge  RESPIRATORY: No cough, no wheezing, no chills, no hemoptysis; No shortness of breath  CARDIOVASCULAR: No chest pain, no palpitations, no dizziness, no leg swelling  GASTROINTESTINAL: No abdominal, no epigastric pain. No nausea, no vomiting, no hematemesis; No diarrhea , no constipation. No melena, no hematochezia.  GENITOURINARY: No dysuria, no frequency, no hematuria, no incontinence  NEUROLOGICAL: No headaches, no memory loss, no loss of strength, no numbness, no tremors  SKIN: No itching, no burning, no rashes, no lesions   LYMPH NODES: No enlarged glands  ENDOCRINE: No heat or cold intolerance; No hair loss  MUSCULOSKELETAL: No joint pain or swelling; No muscle, no back, no extremity pain  PSYCHIATRIC: No depression, no anxiety, no mood swings, no difficulty sleeping  HEME/LYMPH: No easy bruising, no bleeding gums      [VITALS SIGNS 24hrs]  Vital Signs Last 24 Hrs  T(C): 36.8 (2019 07:46), Max: 38 (2019 20:25)  T(F): 98.3 (2019 07:46), Max: 100.4 (2019 20:25)  HR: 68 (2019 07:46) (60 - 84)  BP: 112/58 (2019 07:46) (102/63 - 118/74)  BP(mean): --  RR: 14 (2019 07:46) (14 - 14)  SpO2: 96% (2019 07:46) (95% - 100%)    [PHYSICAL EXAM]  General: adult in NAD,  WN,  WD.  HEENT: clear oropharynx, anicteric sclera, pink conjunctivae.  Neck: supple, no masses.  CV: normal S1S2, no murmur, no rubs, no gallops.  Lungs: clear to auscultation, no wheezes, no rales, no rhonchi.  Abdomen: soft, non-tender, non-distended, no hepatosplenomegaly, normal BS, no guarding.  Ext: no clubbing, no cyanosis, no edema.  Skin: no rashes,  no petechiae, no venous stasis changes.  Neuro: alert and oriented X3, no focal motor deficits.  LN: no SC URI.      [LABS:]                        16.4   4.10  )-----------( 15       ( 2019 04:19 )             46.4     CBC Full  -  ( 2019 04:19 )  WBC Count : 4.10 K/uL  RBC Count : 5.35 M/uL  Hemoglobin : 16.4 g/dL  Hematocrit : 46.4 %  Platelet Count - Automated : 15 K/uL  Mean Cell Volume : 86.7 fl  Mean Cell Hemoglobin : 30.7 pg  Mean Cell Hemoglobin Concentration : 35.3 gm/dL  Auto Neutrophil # : 0.70 K/uL  Auto Lymphocyte # : 2.67 K/uL  Auto Monocyte # : 0.66 K/uL  Auto Eosinophil # : 0.04 K/uL  Auto Basophil # : 0.00 K/uL  Auto Neutrophil % : 13.0 %  Auto Lymphocyte % : 65.0 %  Auto Monocyte % : 16.0 %  Auto Eosinophil % : 1.0 %  Auto Basophil % : 0.0 %        138  |  102  |  8   ----------------------------<  93  4.3   |  31  |  0.99    Ca    8.1<L>      2019 04:19    TPro  5.7<L>  /  Alb  3.0<L>  /  TBili  0.7  /  DBili  x   /  AST  110<H>  /  ALT  67  /  AlkPhos  60      PT/INR - ( 2019 20:12 )   PT: 12.2 sec;   INR: 1.08 ratio         PTT - ( 2019 20:12 )  PTT:51.2 sec  LIVER FUNCTIONS - ( 2019 04:19 )  Alb: 3.0 g/dL / Pro: 5.7 g/dL / ALK PHOS: 60 U/L / ALT: 67 U/L / AST: 110 U/L / GGT: x               Urinalysis Basic - ( 2019 16:46 )    Color: Yellow / Appearance: Clear / S.010 / pH: x  Gluc: x / Ketone: Negative  / Bili: Negative / Urobili: Negative   Blood: x / Protein: 150 mg/dL / Nitrite: Negative   Leuk Esterase: Negative / RBC: 0-2 /HPF / WBC 0-2   Sq Epi: x / Non Sq Epi: Occasional / Bacteria: Occasional        WBC  TREND (5 Days)  WBC Count: 4.10 K/uL ( @ 04:19)  WBC Count: 2.79 K/uL ( @ 16:57)  WBC Count: 3.00 K/uL ( @ 15:36)    HGB  TREND (5 Days)  Hemoglobin: 16.4 g/dL ( @ 04:19)  Hemoglobin: 16.7 g/dL ( @ 16:57)  Hemoglobin: 19.1 g/dL ( @ 15:36)    HCT  TREND (5 Days)  Hematocrit: 46.4 % ( @ 04:19)  Hematocrit: 48.1 % ( @ 16:57)  Hematocrit: 53.7 % ( @ 15:36)    PLT  TREND (5 Days)  Platelet Count - Automated: 15 K/uL ( @ 04:19)  Platelet Count - Automated: 13 K/uL ( @ 16:57)  Platelet Count - Automated: 13 K/uL ( @ 15:36)      Anemia Studies  Reticulocyte Percent: 0.5 % ( @ 16:57)           Vitamin B12, Serum: 512 pg/mL ( @ 02:27)             [BLOOD SMEAR INTERPRETATION: ]  RBC: normocytic, normochromic. No schictocytes, no target cells, no sickled forms, no polychromasia/macrocytosis, no NRBC. No microcytosis. No acanthocytes.     WBC: segs with vacuoles, rare band, lymphs reactive apeparing with rare smudge cell,  no monos / eos not seen/prominent. No blasts.   Plts: decreased. No clumps, no giant plts, rare large plts.         [RADIOLOGY & ADDITIONAL STUDIES:]    < from: CT Head No Cont (19 @ 20:13) >  EXAM:  CT BRAIN                        PROCEDURE DATE:  2019    INTERPRETATION:  Exam Date: 2019 8:13 PM  CT head without IV contrast  CLINICAL INFORMATION:  headache, low platetes ADMDIAG1: D61.818 OTHER PANCYTOPENIA/  TECHNIQUE: Contiguous axial sections were obtained through the head.     This scan was performed using automatic exposure control (radiation dose reduction software) to obtain a diagnostic image quality scan with   patient dose as low as reasonably achievable.    COMPARISON: No previous examinations are available for review.     FINDINGS:     There is no evidence of intraparenchymal or extraaxial hemorrhage. There is contrast within the intracranial circulation from prior CT IV contrast. There is no CT evidence of large vessel acute infarct. No mass effect is found in the brain.  No evidence of midline shift or herniation pattern.  The ventricles, sulci and basal cisterns appear unremarkable.  Visualized paranasal sinuses are clear.      IMPRESSION:     No acute intracranial findings.  < end of copied text >      < from: CT Abdomen and Pelvis w/ Oral Cont and w/ IV Cont (19 @ 18:33) >  EXAM:  CT ABDOMEN AND PELVIS OC IC                        PROCEDURE DATE:  2019    INTERPRETATION:  CLINICAL STATEMENT: abdominal pain, vomiting blood, recent travel to [facility]  TECHNIQUE: CT of the abdomen and pelvis was performed with IV contrast. Oral contrast was administered. Approximately 100 cc of Omnipaque 350 administered.  COMPARISON: None.    FINDINGS:  The lower chest is unremarkable.  The liver is unremarkable. The fluid-filled gallbladder is remarkable for severe gallbladder wall thickening.  The spleen, pancreas and adrenal glands are unremarkable.The kidneys are unremarkable. The fluid-filled bladder appears intact.  There is no bowel obstruction. A normal appendix is seen.  There is no intraperitoneal free air.  There is a small-to-moderate amount of abdominal and pelvic ascites . The aorta is not aneurysmal.  There is no significant abdominal, retroperitoneal or pelvic lymphadenopathy. The pelvic structures are unremarkable.  The osseous structures are unremarkable.    IMPRESSION:  Small to moderate amount of ascites  Gallbladder wall thickening which may be secondary to ascites  Recommend clinical correlation as to etiology as  < end of copied text >        < from: US Gallbladder (19 @ 15:50) >  EXAM:  US GALLBLADDER                        PROCEDURE DATE:  2019    INTERPRETATION:  History: Right upper quadrant pain  Gallbladder ultrasound  Gallbladder is calculi. There is a marked edematous thickening of the gallbladder wall and pericholecystic fluid. This is a nonspecific finding may be reactive secondary to liver disease or hypoalbuminemia. If there is suspicion for cholecystitis, HIDA scan can be obtained. No biliary dilatation. Common duct 0.3 cm.    Impression:  Nonspecific edematous gallbladder wall and pericholecystic fluid as discussed.  < end of copied text > Patient is a 24y old  Male who presents with a chief complaint of fevers (2019 22:38)      HPI:  23yo M, with no significant PMh, c/o 5 day duration of fever and abdominal pain that developed yesterday. Pt states he returned from [facility] on  where he spent nine days. During this time he was bitten by mosquitoes multiple times. He traveled with family, and none of his travel companions are complaining of similar symptoms. Per pt, fevers started on . Tmax at home 103-104. The following day he visited urgent care who advised alternating 2 tabs of double-strength Tylenol with 3 tabs of 200mg Motrin every few hours. Fever would respond to therapy but always recurred. Yesterday, pt developed abdominal pain mostly in the right upper quadrant that is associated with nausea, vomiting, and nonbloody diarrhea. Today, pt noted streaks of blood in his vomit and has been unable to keep solids and fluids down. Pt states he received pre-travel prophylactic care including MMRV and TB at an urgent care but can't remember if he received anything else. Endorses b/l knee pain, calf pain, scattered rash, but denies chest pain, palpitations, SOB, cough, or urinary symptoms.    In the ED  VS T 99.7 now 100.4 /68 now 103/69 HR 84 RR 14 SpO2 96%  Significant labs include: WBC 2.79, Platelets 13, Na 134, .  CT head: no evidence of acute intracranial hemorrhage  CT a/p: severe gallbladder thickening and mild-mod abdominal and pelvic ascites  US gallbladder: nonspecific edematous gallbladder with pericholecystic fluid    Received 2L NS, Protonix, Pepcid, Zofran, 1x Zosyn, Tylenol 650mg. (2019 20:45)    Heme asked to see pt for leukopenia and  thrombocytoepnia.   Pt of [facility] ancestry, [place] 1[place].   Returned to [facility] recently for 9d 2wk ago, to bury mother-in-law.   [place] in [place] [facility]. Town of ~ [place]pp[place].   Water from [employer] for bathing. Drank filtered water.   Alot of mosquitoes. No other significant outdoor activities.   [place] last [place] 19. Developed fever and malaise 19 Fri.  Was in Urgent care Sat 19 and dx with non-specific viral illness. Flu swab neg.   Took Ibprofen 600mg q6h, and Tylenol 1000mg q6hr for last 3d.   Noted abd pain on Mon, RUQ.   +diarrhea  feels dehydrated, urine output decreased, urianted once this AM.         PAST MEDICAL & SURGICAL HISTORY:  No pertinent past medical history  No significant past surgical history      HEALTH ISSUES - PROBLEM Dx:  Need for prophylactic measure: Need for prophylactic measure  Gallbladder disease: Gallbladder disease  Gastroenteritis: Gastroenteritis  Leukopenia: Leukopenia  Thrombocytopenia: Thrombocytopenia          Other pancytopenia (D61.818)  No pertinent past medical history ([id number])  No significant past surgical history ([id number])  Gallbladder problem (K82.9)  Gastroenteritis (K52.9)      FAMILY HISTORY:  No pertinent family history in first degree relatives    mother 45yo, DM but stopped medications  maternal half brother 28yo    father unknown    [SOCIAL HISTORY: ]     smokin.25PPD     EtOH:  denies     illicit drugs:  denies     occupation:       marital status:  , no children     Other:       [ALLERGIES/INTOLERANCES:]  Allergies      No Known Allergies  Intolerances      [MEDICATIONS]  MEDICATIONS  (STANDING):  influenza   Vaccine 0.5 milliLiter(s) IntraMuscular once  multivitamin 1 Tablet(s) Oral daily  piperacillin/tazobactam IVPB.. 3.375 Gram(s) IV Intermittent every 8 hours    MEDICATIONS  (PRN):  acetaminophen   Tablet .. 650 milliGRAM(s) Oral every 6 hours PRN Temp greater or equal to 38C (100.4F), Mild Pain (1 - 3)  ondansetron Injectable 4 milliGRAM(s) IV Push every 8 hours PRN Nausea and/or Vomiting  senna 2 Tablet(s) Oral at bedtime PRN Constipation        [REVIEW OF SYSTEMS: ]  CONSTITUTIONAL: normal, +fever, no shakes, no chills   EYES: No eye pain, no visual disturbances, no discharge  ENMT:  no discharge  NECK: No pain, no stiffness  BREASTS: No pain, no masses, no nipple discharge  RESPIRATORY: No cough, no wheezing, no chills, no hemoptysis; No shortness of breath  CARDIOVASCULAR: No chest pain, no palpitations, no dizziness, no leg swelling  GASTROINTESTINAL: +abdominal, no epigastric pain. +nausea, no vomiting, no hematemesis; +diarrhea , no constipation. No melena, no hematochezia.  GENITOURINARY: No dysuria, no frequency, no hematuria, no incontinence  NEUROLOGICAL: +headaches, no memory loss, no loss of strength, no numbness, no tremors  SKIN: No itching, no burning, no rashes, no lesions   LYMPH NODES: No enlarged glands  ENDOCRINE: No heat or cold intolerance; No hair loss  MUSCULOSKELETAL: No joint pain or swelling; No muscle, no back, no extremity pain  PSYCHIATRIC: No depression, no anxiety, no mood swings, no difficulty sleeping  HEME/LYMPH: No easy bruising, no bleeding gums      [VITALS SIGNS 24hrs]  Vital Signs Last 24 Hrs  T(C): 36.8 (2019 07:46), Max: 38 (2019 20:25)  T(F): 98.3 (2019 07:46), Max: 100.4 (2019 20:25)  HR: 68 (2019 07:46) (60 - 84)  BP: 112/58 (2019 07:46) (102/63 - 118/74)  BP(mean): --  RR: 14 (2019 07:46) (14 - 14)  SpO2: 96% (2019 07:46) (95% - 100%)    [PHYSICAL EXAM]  General: adult in NAD,  WN,  WD.  HEENT: clear oropharynx, anicteric sclera, pink conjunctivae.  Neck: supple, no masses.  CV: normal S1S2, no murmur, no rubs, no gallops.  Lungs: clear to auscultation, no wheezes, no rales, no rhonchi.  Abdomen: soft, non-tender, non-distended, no hepatosplenomegaly, normal BS, no guarding.  Ext: no clubbing, no cyanosis, no edema.  Skin: no rashes,  no petechiae, no venous stasis changes.  Neuro: alert and oriented X3, no focal motor deficits.  LN: no SC URI.      [LABS:]                        16.4   4.10  )-----------( 15       ( 2019 04:19 )             46.4     CBC Full  -  ( 2019 04:19 )  WBC Count : 4.10 K/uL  RBC Count : 5.35 M/uL  Hemoglobin : 16.4 g/dL  Hematocrit : 46.4 %  Platelet Count - Automated : 15 K/uL  Mean Cell Volume : 86.7 fl  Mean Cell Hemoglobin : 30.7 pg  Mean Cell Hemoglobin Concentration : 35.3 gm/dL  Auto Neutrophil # : 0.70 K/uL  Auto Lymphocyte # : 2.67 K/uL  Auto Monocyte # : 0.66 K/uL  Auto Eosinophil # : 0.04 K/uL  Auto Basophil # : 0.00 K/uL  Auto Neutrophil % : 13.0 %  Auto Lymphocyte % : 65.0 %  Auto Monocyte % : 16.0 %  Auto Eosinophil % : 1.0 %  Auto Basophil % : 0.0 %        138  |  102  |  8   ----------------------------<  93  4.3   |  31  |  0.99    Ca    8.1<L>      2019 04:19    TPro  5.7<L>  /  Alb  3.0<L>  /  TBili  0.7  /  DBili  x   /  AST  110<H>  /  ALT  67  /  AlkPhos  60      PT/INR - ( 2019 20:12 )   PT: 12.2 sec;   INR: 1.08 ratio         PTT - ( 2019 20:12 )  PTT:51.2 sec  LIVER FUNCTIONS - ( 2019 04:19 )  Alb: 3.0 g/dL / Pro: 5.7 g/dL / ALK PHOS: 60 U/L / ALT: 67 U/L / AST: 110 U/L / GGT: x               Urinalysis Basic - ( 2019 16:46 )    Color: Yellow / Appearance: Clear / S.010 / pH: x  Gluc: x / Ketone: Negative  / Bili: Negative / Urobili: Negative   Blood: x / Protein: 150 mg/dL / Nitrite: Negative   Leuk Esterase: Negative / RBC: 0-2 /HPF / WBC 0-2   Sq Epi: x / Non Sq Epi: Occasional / Bacteria: Occasional        WBC  TREND (5 Days)  WBC Count: 4.10 K/uL ( @ 04:19)  WBC Count: 2.79 K/uL ( @ 16:57)  WBC Count: 3.00 K/uL ( @ 15:36)    HGB  TREND (5 Days)  Hemoglobin: 16.4 g/dL ( @ 04:19)  Hemoglobin: 16.7 g/dL ( @ 16:57)  Hemoglobin: 19.1 g/dL ( @ 15:36)    HCT  TREND (5 Days)  Hematocrit: 46.4 % ( @ 04:19)  Hematocrit: 48.1 % ( @ 16:57)  Hematocrit: 53.7 % ( @ 15:36)    PLT  TREND (5 Days)  Platelet Count - Automated: 15 K/uL ( @ 04:19)  Platelet Count - Automated: 13 K/uL ( @ 16:57)  Platelet Count - Automated: 13 K/uL ( @ 15:36)    Anemia Studies  Reticulocyte Percent: 0.5 % ( @ 16:57)         Vitamin B12, Serum: 512 pg/mL ( @ 02:27)             [BLOOD SMEAR INTERPRETATION: ]  RBC: normocytic, normochromic. No schictocytes, no target cells, no sickled forms, no polychromasia/macrocytosis, no NRBC. No microcytosis. No acanthocytes.   WBC: segs with vacuoles, rare band, lymphs reactive apeparing with rare smudge cell,  no monos / eos not seen/prominent. No blasts.   Plts: decreased. No clumps, no giant plts, rare large plts.         [RADIOLOGY & ADDITIONAL STUDIES:]    < from: CT Head No Cont (19 @ 20:13) >  EXAM:  CT BRAIN                        PROCEDURE DATE:  2019    INTERPRETATION:  Exam Date: 2019 8:13 PM  CT head without IV contrast  CLINICAL INFORMATION:  headache, low platetes ADMDIAG1: D61.818 OTHER PANCYTOPENIA/  TECHNIQUE: Contiguous axial sections were obtained through the head.     This scan was performed using automatic exposure control (radiation dose reduction software) to obtain a diagnostic image quality scan with   patient dose as low as reasonably achievable.    COMPARISON: No previous examinations are available for review.     FINDINGS:     There is no evidence of intraparenchymal or extraaxial hemorrhage. There is contrast within the intracranial circulation from prior CT IV contrast. There is no CT evidence of large vessel acute infarct. No mass effect is found in the brain.  No evidence of midline shift or herniation pattern.  The ventricles, sulci and basal cisterns appear unremarkable.  Visualized paranasal sinuses are clear.      IMPRESSION:     No acute intracranial findings.  < end of copied text >      < from: CT Abdomen and Pelvis w/ Oral Cont and w/ IV Cont (19 @ 18:33) >  EXAM:  CT ABDOMEN AND PELVIS OC IC                        PROCEDURE DATE:  2019    INTERPRETATION:  CLINICAL STATEMENT: abdominal pain, vomiting blood, recent travel to [facility]  TECHNIQUE: CT of the abdomen and pelvis was performed with IV contrast. Oral contrast was administered. Approximately 100 cc of Omnipaque 350 administered.  COMPARISON: None.    FINDINGS:  The lower chest is unremarkable.  The liver is unremarkable. The fluid-filled gallbladder is remarkable for severe gallbladder wall thickening.  The spleen, pancreas and adrenal glands are unremarkable.The kidneys are unremarkable. The fluid-filled bladder appears intact.  There is no bowel obstruction. A normal appendix is seen.  There is no intraperitoneal free air.  There is a small-to-moderate amount of abdominal and pelvic ascites . The aorta is not aneurysmal.  There is no significant abdominal, retroperitoneal or pelvic lymphadenopathy. The pelvic structures are unremarkable.  The osseous structures are unremarkable.    IMPRESSION:  Small to moderate amount of ascites  Gallbladder wall thickening which may be secondary to ascites  Recommend clinical correlation as to etiology as  < end of copied text >        < from: US Gallbladder (19 @ 15:50) >  EXAM:  US GALLBLADDER                        PROCEDURE DATE:  2019    INTERPRETATION:  History: Right upper quadrant pain  Gallbladder ultrasound  Gallbladder is calculi. There is a marked edematous thickening of the gallbladder wall and pericholecystic fluid. This is a nonspecific finding may be reactive secondary to liver disease or hypoalbuminemia. If there is suspicion for cholecystitis, HIDA scan can be obtained. No biliary dilatation. Common duct 0.3 cm.    Impression:  Nonspecific edematous gallbladder wall and pericholecystic fluid as discussed.  < end of copied text >

## 2019-11-13 NOTE — CONSULT NOTE ADULT - SUBJECTIVE AND OBJECTIVE BOX
[facility]  Pardeep Arnett PA-C  [address] [place] [id number]  [phone]      Chief Complaint:  Patient is a 24y old  Male who presents with a chief complaint of fevers (2019 10:02)      HPI: 25yo M, with no significant PMh, c/o 5 day duration of fever and abdominal pain that developed yesterday. Pt states he returned from [facility] on  where he spent nine days. During this time he was bitten by mosquitoes multiple times. He traveled with family, and none of his travel companions are complaining of similar symptoms. Per pt, fevers started on . Tmax at home 103-104. The following day he visited urgent care who advised alternating 2 tabs of double-strength Tylenol with 3 tabs of 200mg Motrin every few hours. Fever would respond to therapy but always recurred. Yesterday, pt developed abdominal pain mostly in the right upper quadrant that is associated with nausea, vomiting, and nonbloody diarrhea. Today, pt noted streaks of blood in his vomit and has been unable to keep solids and fluids down.   Allergies:  No Known Allergies      Medications:  acetaminophen   Tablet .. 650 milliGRAM(s) Oral every 6 hours PRN  cyanocobalamin 1000 MICROGram(s) Oral daily  dextrose 5% + sodium chloride 0.45%. 2000 milliLiter(s) IV Continuous <Continuous>  influenza   Vaccine 0.5 milliLiter(s) IntraMuscular once  multivitamin 1 Tablet(s) Oral daily  ondansetron Injectable 4 milliGRAM(s) IV Push every 8 hours PRN  piperacillin/tazobactam IVPB.. 3.375 Gram(s) IV Intermittent every 8 hours  senna 2 Tablet(s) Oral at bedtime PRN      PMHX/PSHX:  No pertinent past medical history  No significant past surgical history      Family history:  No pertinent family history in first degree relatives      Social History: no toxic habits     ROS:     General:  No wt loss, + fevers, chills, night sweats, fatigue,   Eyes:  Good vision, no reported pain  ENT:  No sore throat, pain, runny nose, dysphagia  CV:  No pain, palpitations, hypo/hypertension  Resp:  No dyspnea, cough, tachypnea, wheezing  GI:  + pain, No nausea, + vomiting, No diarrhea, No constipation, No weight loss, No fever, No pruritis, No rectal bleeding, No tarry stools, No dysphagia,  :  No pain, bleeding, incontinence, nocturia  Muscle:  No pain, weakness  Neuro:  No weakness, tingling, memory problems  Psych:  No fatigue, insomnia, mood problems, depression  Endocrine:  No polyuria, polydipsia, cold/heat intolerance  Heme:  No petechiae, ecchymosis, easy bruisability  Skin:  No rash, tattoos, scars, edema      PHYSICAL EXAM:   Vital Signs:  Vital Signs Last 24 Hrs  T(C): 36.8 (2019 07:46), Max: 38 (2019 20:25)  T(F): 98.3 (2019 07:46), Max: 100.4 (2019 20:25)  HR: 68 (2019 07:46) (60 - 84)  BP: 112/58 (2019 07:46) (102/63 - 118/74)  BP(mean): --  RR: 14 (2019 07:46) (14 - 14)  SpO2: 96% (2019 07:46) (95% - 100%)  Daily     Daily     GENERAL:  Appears stated age, well-groomed, well-nourished, no distress  HEENT:  NC/AT,  conjunctivae clear and pink, no thyromegaly, nodules, adenopathy, no JVD, sclera -anicteric  CHEST:  Full & symmetric excursion, no increased effort, breath sounds clear  HEART:  Regular rhythm, S1, S2, no murmur/rub/S3/S4, no abdominal bruit, no edema  ABDOMEN:  Soft, non-tender, non-distended, normoactive bowel sounds,  no masses ,no hepato-splenomegaly, no signs of chronic liver disease  EXTEREMITIES:  no cyanosis,clubbing or edema  SKIN:  No rash/erythema/ecchymoses/petechiae/wounds/abscess/warm/dry  NEURO:  Alert, oriented, no asterixis, no tremor, no encephalopathy    LABS:                        16.4   4.10  )-----------( 15       ( 2019 04:19 )             46.4     -13    138  |  102  |  8   ----------------------------<  93  4.3   |  31  |  0.99    Ca    8.1<L>      2019 04:19    TPro  5.7<L>  /  Alb  3.0<L>  /  TBili  0.7  /  DBili  x   /  AST  110<H>  /  ALT  67  /  AlkPhos  60  13    LIVER FUNCTIONS - ( 2019 04:19 )  Alb: 3.0 g/dL / Pro: 5.7 g/dL / ALK PHOS: 60 U/L / ALT: 67 U/L / AST: 110 U/L / GGT: x           PT/INR - ( 2019 20:12 )   PT: 12.2 sec;   INR: 1.08 ratio         PTT - ( 2019 20:12 )  PTT:51.2 sec  Urinalysis Basic - ( 2019 16:46 )    Color: Yellow / Appearance: Clear / S.010 / pH: x  Gluc: x / Ketone: Negative  / Bili: Negative / Urobili: Negative   Blood: x / Protein: 150 mg/dL / Nitrite: Negative   Leuk Esterase: Negative / RBC: 0-2 /HPF / WBC 0-2   Sq Epi: x / Non Sq Epi: Occasional / Bacteria: Occasional      Amylase Serum--      Lipase [id number]       Ammonia--      Imaging:

## 2019-11-14 DIAGNOSIS — K92.0 HEMATEMESIS: ICD-10-CM

## 2019-11-14 DIAGNOSIS — R19.7 DIARRHEA, UNSPECIFIED: ICD-10-CM

## 2019-11-14 LAB
ALBUMIN SERPL ELPH-MCNC: 2.9 G/DL — LOW (ref 3.3–5)
ALP SERPL-CCNC: 64 U/L — SIGNIFICANT CHANGE UP (ref 40–120)
ALT FLD-CCNC: 128 U/L — HIGH (ref 12–78)
ANION GAP SERPL CALC-SCNC: 4 MMOL/L — LOW (ref 5–17)
AST SERPL-CCNC: 186 U/L — HIGH (ref 15–37)
BILIRUB SERPL-MCNC: 0.6 MG/DL — SIGNIFICANT CHANGE UP (ref 0.2–1.2)
BUN SERPL-MCNC: 5 MG/DL — LOW (ref 7–23)
CALCIUM SERPL-MCNC: 7.9 MG/DL — LOW (ref 8.5–10.1)
CHLORIDE SERPL-SCNC: 104 MMOL/L — SIGNIFICANT CHANGE UP (ref 96–108)
CO2 SERPL-SCNC: 31 MMOL/L — SIGNIFICANT CHANGE UP (ref 22–31)
CREAT SERPL-MCNC: 0.85 MG/DL — SIGNIFICANT CHANGE UP (ref 0.5–1.3)
GLUCOSE SERPL-MCNC: 96 MG/DL — SIGNIFICANT CHANGE UP (ref 70–99)
HCT VFR BLD CALC: 43.9 % — SIGNIFICANT CHANGE UP (ref 39–50)
HGB BLD-MCNC: 15.4 G/DL — SIGNIFICANT CHANGE UP (ref 13–17)
MCHC RBC-ENTMCNC: 30.6 PG — SIGNIFICANT CHANGE UP (ref 27–34)
MCHC RBC-ENTMCNC: 35.1 GM/DL — SIGNIFICANT CHANGE UP (ref 32–36)
MCV RBC AUTO: 87.1 FL — SIGNIFICANT CHANGE UP (ref 80–100)
NRBC # BLD: 0 /100 WBCS — SIGNIFICANT CHANGE UP (ref 0–0)
PLATELET # BLD AUTO: 26 K/UL — LOW (ref 150–400)
POTASSIUM SERPL-MCNC: 4 MMOL/L — SIGNIFICANT CHANGE UP (ref 3.5–5.3)
POTASSIUM SERPL-SCNC: 4 MMOL/L — SIGNIFICANT CHANGE UP (ref 3.5–5.3)
PROT SERPL-MCNC: 6 G/DL — SIGNIFICANT CHANGE UP (ref 6–8.3)
RBC # BLD: 5.04 M/UL — SIGNIFICANT CHANGE UP (ref 4.2–5.8)
RBC # FLD: 11.6 % — SIGNIFICANT CHANGE UP (ref 10.3–14.5)
SODIUM SERPL-SCNC: 139 MMOL/L — SIGNIFICANT CHANGE UP (ref 135–145)
WBC # BLD: 3.15 K/UL — LOW (ref 3.8–10.5)
WBC # FLD AUTO: 3.15 K/UL — LOW (ref 3.8–10.5)

## 2019-11-14 PROCEDURE — 99233 SBSQ HOSP IP/OBS HIGH 50: CPT

## 2019-11-14 PROCEDURE — 99231 SBSQ HOSP IP/OBS SF/LOW 25: CPT

## 2019-11-14 RX ORDER — SODIUM CHLORIDE 9 MG/ML
1000 INJECTION INTRAMUSCULAR; INTRAVENOUS; SUBCUTANEOUS
Refills: 0 | Status: DISCONTINUED | OUTPATIENT
Start: 2019-11-14 | End: 2019-11-16

## 2019-11-14 RX ORDER — PANTOPRAZOLE SODIUM 20 MG/1
40 TABLET, DELAYED RELEASE ORAL
Refills: 0 | Status: DISCONTINUED | OUTPATIENT
Start: 2019-11-14 | End: 2019-11-15

## 2019-11-14 RX ADMIN — PREGABALIN 1000 MICROGRAM(S): 225 CAPSULE ORAL at 11:06

## 2019-11-14 RX ADMIN — SODIUM CHLORIDE 100 MILLILITER(S): 9 INJECTION INTRAMUSCULAR; INTRAVENOUS; SUBCUTANEOUS at 09:05

## 2019-11-14 RX ADMIN — CEFTRIAXONE 100 MILLIGRAM(S): 500 INJECTION, POWDER, FOR SOLUTION INTRAMUSCULAR; INTRAVENOUS at 13:28

## 2019-11-14 RX ADMIN — SODIUM CHLORIDE 100 MILLILITER(S): 9 INJECTION INTRAMUSCULAR; INTRAVENOUS; SUBCUTANEOUS at 21:37

## 2019-11-14 RX ADMIN — Medication 1 TABLET(S): at 11:06

## 2019-11-14 NOTE — PROGRESS NOTE ADULT - SUBJECTIVE AND OBJECTIVE BOX
[INTERVAL HX: ]  Patient seen and examined;  Chart reviewed and events noted;   c/o mild abd pain.   diarrhea better      Patient is a 24y Male with a known history of :  Other pancytopenia (D61.818) [Active]  No pertinent past medical history ([id number]) [Active]  No significant past surgical history ([id number]) [Active]    HPI:  23yo M, with no significant PMh, c/o 5 day duration of fever and abdominal pain that developed yesterday. Pt states he returned from [facility], [place] on 11/2 where he spent nine days. During this time he was bitten by mosquitoes multiple times. He traveled with family, and none of his travel companions are complaining of similar symptoms. Per pt, fevers started on Friday 11/8. Tmax at home 103-104. The following day he visited urgent care who advised alternating 2 tabs of double-strength Tylenol with 3 tabs of 200mg Motrin every few hours. Fever would respond to therapy but always recurred. Yesterday, pt developed abdominal pain mostly in the right upper quadrant that is associated with nausea, vomiting, and nonbloody diarrhea. Today, pt noted streaks of blood in his vomit and has been unable to keep solids and fluids down. Pt states he received pre-travel prophylactic care including MMRV and TB at an urgent care but can't remember if he received anything else. Endorses b/l knee pain, calf pain, scattered rash, but denies chest pain, palpitations, SOB, cough, or urinary symptoms.    In the ED  VS T 99.7 now 100.4 /68 now 103/69 HR 84 RR 14 SpO2 96%  Significant labs include: WBC 2.79, Platelets 13, Na 134, .  CT head: no evidence of acute intracranial hemorrhage  CT a/p: severe gallbladder thickening and mild-mod abdominal and pelvic ascites  US gallbladder: nonspecific edematous gallbladder with pericholecystic fluid    Received 2L NS, Protonix, Pepcid, Zofran, 1x Zosyn, Tylenol 650mg. (12 Nov 2019 20:45)            MEDICATIONS  (STANDING):  cefTRIAXone   IVPB 2000 milliGRAM(s) IV Intermittent every 24 hours  cyanocobalamin 1000 MICROGram(s) Oral daily  influenza   Vaccine 0.5 milliLiter(s) IntraMuscular once  multivitamin 1 Tablet(s) Oral daily  pantoprazole    Tablet 40 milliGRAM(s) Oral before breakfast  sodium chloride 0.9%. 1000 milliLiter(s) (100 mL/Hr) IV Continuous <Continuous>    MEDICATIONS  (PRN):  acetaminophen   Tablet .. 650 milliGRAM(s) Oral every 6 hours PRN Temp greater or equal to 38C (100.4F), Mild Pain (1 - 3)  ondansetron Injectable 4 milliGRAM(s) IV Push every 8 hours PRN Nausea and/or Vomiting  senna 2 Tablet(s) Oral at bedtime PRN Constipation      Vital Signs Last 24 Hrs  T(C): 37 (14 Nov 2019 04:39), Max: 37.3 (13 Nov 2019 15:26)  T(F): 98.6 (14 Nov 2019 04:39), Max: 99.2 (13 Nov 2019 15:26)  HR: 59 (14 Nov 2019 04:39) (59 - 65)  BP: 92/58 (14 Nov 2019 04:39) (92/58 - 105/64)  BP(mean): --  RR: 17 (14 Nov 2019 04:39) (15 - 17)  SpO2: 100% (14 Nov 2019 04:39) (97% - 100%)      [PHYSICAL EXAM]  General: adult in NAD,  WN,  WD.  HEENT: clear oropharynx, anicteric sclera, pink conjunctivae.  Neck: supple, no masses.  CV: normal S1S2, no murmur, no rubs, no gallops.  Lungs: clear to auscultation, no wheezes, no rales, no rhonchi.  Abdomen: soft, non-tender, non-distended, no hepatosplenomegaly, normal BS, no guarding.  Ext: no clubbing, no cyanosis, no edema.  Skin: no rashes,  no petechiae, no venous stasis changes.  Neuro: alert and oriented X3  , no focal motor deficits.  LN: no SC URI.      [LABS:]                        15.4   3.15  )-----------( 26       ( 14 Nov 2019 08:24 )             43.9     11-14    139  |  104  |  5<L>  ----------------------------<  96  4.0   |  31  |  0.85    Ca    7.9<L>      14 Nov 2019 08:24    TPro  6.0  /  Alb  2.9<L>  /  TBili  0.6  /  DBili  x   /  AST  186<H>  /  ALT  128<H>  /  AlkPhos  64  11-14    PT/INR - ( 12 Nov 2019 20:12 )   PT: 12.2 sec;   INR: 1.08 ratio    PTT - ( 12 Nov 2019 20:12 )  PTT:51.2 sec      Culture - Stool (collected 11-13-19 @ 11:56)  Source: .Stool Feces  Preliminary Report (11-14-19 @ 11:39):    No enteric pathogens to date: Final culture pending    GI PCR Panel, Stool (collected 11-13-19 @ 11:56)  Source: .Stool Feces  Final Report (11-13-19 @ 15:49): Enteropathogenic E. coli (EPEC)    DETECTED by PCR    *******Please Note:*******    GI panel PCR evaluates for:   Campylobacter, Plesiomonas shigelloides, Salmonella, Vibrio, Yersinia enterocolitica, Enteroaggregative Escherichia coli (EAEC), Enteropathogenic E.coli (EPEC), Enterotoxigenic E. coli (ETEC) lt/st, Shiga-like   toxin-producing E. coli (STEC) stx1/stx2, Shigella/ Enteroinvasive E. coli (EIEC), Cryptosporidium, Cyclospora cayetanensis, Entamoeba histolytica,    Giardia lamblia, AdenovirusF 40/41, Astrovirus, Norovirus GI/GII, Rotavirus A, Sapovirus              [RADIOLOGY STUDIES:]

## 2019-11-14 NOTE — PROGRESS NOTE ADULT - SUBJECTIVE AND OBJECTIVE BOX
Patient is a 24y old  Male who presents with a chief complaint of fevers (2019 11:33)      INTERVAL HPI: Pt seen and examined bedside, has no complaints. Pt has no fever, N/v CP or SOB. has less epigastric tenderness. diarrhea improving.   OVERNIGHT EVENTS:  Vital Signs Last 24 Hrs  T(C): 37 (2019 04:39), Max: 37.3 (2019 15:26)  T(F): 98.6 (2019 04:39), Max: 99.2 (2019 15:26)  HR: 59 (2019 04:39) (59 - 65)  BP: 92/58 (2019 04:39) (92/58 - 103/66)  BP(mean): --  RR: 17 (2019 04:39) (15 - 17)  SpO2: 100% (2019 04:39) (97% - 100%)      Review of System:    REVIEW OF SYSTEMS  Constitutional: No fever, chills, fatigue  Neuro: No headache, numbness, weakness  Resp: No cough, wheezing, shortness of breath  CVS: No chest pain, palpitations, leg swelling  GI: +epigastric pain, no nausea, vomiting, diarrhea   : No dysuria, frequency, incontinence  Skin: No itching, burning, rashes, or lesions   Msk: No joint pain or swelling  Psych: No depression, anxiety, mood swings      PHYSICAL EXAM:  GENERAL: NAD, well-groomed, well-developed  HEAD:  Atraumatic, Normocephalic  EYES: EOMI, PERRLA, conjunctiva and sclera clear  ENMT:  Moist mucous membranes  NECK: Supple, No JVD, Normal thyroid  HEART: Regular rate and rhythm; No murmurs, rubs, or gallops  RESPIRATORY: CTA B/L, No wheezing/ rhonchi  ABDOMEN: Soft, Nontender, Nondistended; Bowel sounds present  NEUROLOGY: A&Ox3, nonfocal, CN II-XII grossly intact, sensation intact  EXTREMITIES:  2+ Peripheral Pulses, No clubbing, cyanosis, or edema  SKIN: warm, dry, normal color, no rash or abnormal lesions        LABS:                        15.4   3.15  )-----------( 26       ( 2019 08:24 )             43.9     2019 08:24    139    |  104    |  5      ----------------------------<  96     4.0     |  31     |  0.85     Ca    7.9        2019 08:24    TPro  6.0    /  Alb  2.9    /  TBili  0.6    /  DBili  x      /  AST  186    /  ALT  128    /  AlkPhos  64     2019 08:24    PT/INR - ( 2019 20:12 )   PT: 12.2 sec;   INR: 1.08 ratio         PTT - ( 2019 20:12 )  PTT:51.2 sec  Urinalysis Basic - ( 2019 16:46 )    Color: Yellow / Appearance: Clear / S.010 / pH: x  Gluc: x / Ketone: Negative  / Bili: Negative / Urobili: Negative   Blood: x / Protein: 150 mg/dL / Nitrite: Negative   Leuk Esterase: Negative / RBC: 0-2 /HPF / WBC 0-2   Sq Epi: x / Non Sq Epi: Occasional / Bacteria: Occasional      CAPILLARY BLOOD GLUCOSE        UCx       RADIOLOGY & ADDITIONAL TESTS:           @ 11:56   Enteropathogenic E. coli (EPEC)  DETECTED by PCR  *******Please Note:*******  GI panel PCR evaluates for:  Campylobacter, Plesiomonas shigelloides, Salmonella,  Vibrio, Yersinia enterocolitica, Enteroaggregative  Escherichia coli (EAEC), Enteropathogenic E.coli (EPEC),  Enterotoxigenic E. coli (ETEC) lt/st, Shiga-like  toxin-producing E. coli (STEC) stx1/stx2,  Shigella/ Enteroinvasive E. coli (EIEC), Cryptosporidium,  Cyclospora cayetanensis, Entamoeba histolytica,  Giardia lamblia, AdenovirusF 40/41, Astrovirus,  Norovirus GI/GII, Rotavirus A, Sapovirus  --  --          Diet:    RADIOLOGY & ADDITIONAL TESTS:    Imaging Personally Reviewed:  [x] YES  [ ] NO    Consultant(s) Notes Reviewed:  [x ] YES  [ ] NO    MEDICATIONS  (STANDING):  cefTRIAXone   IVPB 2000 milliGRAM(s) IV Intermittent every 24 hours  cyanocobalamin 1000 MICROGram(s) Oral daily  dextrose 5% + sodium chloride 0.45%. 2000 milliLiter(s) (150 mL/Hr) IV Continuous <Continuous>  influenza   Vaccine 0.5 milliLiter(s) IntraMuscular once  multivitamin 1 Tablet(s) Oral daily    MEDICATIONS  (PRN):  acetaminophen   Tablet .. 650 milliGRAM(s) Oral every 6 hours PRN Temp greater or equal to 38C (100.4F), Mild Pain (1 - 3)  ondansetron Injectable 4 milliGRAM(s) IV Push every 8 hours PRN Nausea and/or Vomiting  senna 2 Tablet(s) Oral at bedtime PRN Constipation        Disposition:    DVT Prophylaxis:  Subcu Heparin [  ]     LMWH [ ]     Coumadin [ ]    Xaeralto [ ]    Eliquis [ ]   Venodyne pumps [x ]    Discussed with Patient [ ]     Family [ ]          [ ]   RN[ ]      [ ]    Advance Directives:      Palliative Care:    Care Discussed with Consultants/Other Providers [x ] YES  [ ] NO

## 2019-11-14 NOTE — PROGRESS NOTE ADULT - SUBJECTIVE AND OBJECTIVE BOX
Hospital day 2    24y Male admitted with Other pancytopenia  .    Patient seen and examined bedside resting comfortably.  States is tolerating PO intake. Does have some mild epigastric discomfort not associated with food.  Is passing flatus.  Had a small bowel movement last night.  Denies fevers, SOB, chest pain, N/V/D, lower leg/calf tenderness.     T(F): 98.6 (11-14-19 @ 04:39), Max: 99.2 (11-13-19 @ 15:26)  HR: 59 (11-14-19 @ 04:39) (59 - 65)  BP: 92/58 (11-14-19 @ 04:39) (92/58 - 105/64)  RR: 17 (11-14-19 @ 04:39) (15 - 17)  SpO2: 100% (11-14-19 @ 04:39) (97% - 100%)  Wt(kg): --  CAPILLARY BLOOD GLUCOSE          PHYSICAL EXAM:  General: NAD, WDWN.   Neuro:  Alert & oriented x 3  Abdomen: soft, ND. BS+  + Mild RUQ, LUQ tenderness.   Extremities: no pedal edema or calf tenderness noted       LABS:                        15.4   3.15  )-----------( 26       ( 14 Nov 2019 08:24 )             43.9     11-14    139  |  104  |  5<L>  ----------------------------<  96  4.0   |  31  |  0.85    Ca    7.9<L>      14 Nov 2019 08:24    TPro  6.0  /  Alb  2.9<L>  /  TBili  0.6  /  DBili  x   /  AST  186<H>  /  ALT  128<H>  /  AlkPhos  64  11-14    PT/INR - ( 12 Nov 2019 20:12 )   PT: 12.2 sec;   INR: 1.08 ratio         PTT - ( 12 Nov 2019 20:12 )  PTT:51.2 sec  I&O's Detail    13 Nov 2019 07:01  -  14 Nov 2019 07:00  --------------------------------------------------------  IN:    dextrose 5% + sodium chloride 0.45%.: 600 mL  Total IN: 600 mL    OUT:  Total OUT: 0 mL    Total NET: 600 mL          Impression: 24y Male admitted with Other pancytopenia    PMH No pertinent past medical history      Plan:  -Continue low fiber diet.  -Continue to monitor CBC  -Management per GI, medicine  -No surgical intervention  -Continue antibiotics.   -Gi note appreciated.  -Discussed with Dr. Sutherland

## 2019-11-14 NOTE — PROGRESS NOTE ADULT - ASSESSMENT
Presentation most suggestive of Dengue  Now also with EPEC in stool. Would explain diarrhea but not likely to explain other symptoms.  No real concern of hemolysis. D/W Dr. Cunha (H/O) no concern of TTP/HUS (and not the typical strain of E. coli for it either)  Diarrhea better  Suspect midepigastric discomfort related to motrin/gastritis  Now with LFT elevations which could be related to tylenol ingestion but suspect related to Dengue  Appears to have defervesced    Suggestions--  Continue Ceftriaxone pending blood culture data  Serial exams  Trend fever curve  IVF  F/U CBC  F/U Blood cx data  Await Dengue serology

## 2019-11-14 NOTE — PROGRESS NOTE ADULT - PROBLEM SELECTOR PLAN 1
improved  cdiff neg, gi pcr cw epec  abx per id, f/u recs, further w/u in progress  diet as tolerated  monitor exam/gi function improved  cdiff neg, gi pcr cw epec  abx per id, f/u recs, further w/u in progress  ivf/diet as tolerated  monitor exam/gi function

## 2019-11-14 NOTE — CHART NOTE - NSCHARTNOTEFT_GEN_A_CORE
Notified by RN that patient's manual BP is 92/58. Patients BP has been on the low end of normal since admission. Patient with mild abdominal pain at this time. Last episode of diarrhea was NB and last night at ~7pm. Continue IV maintenance fluids. Will continue to monitor, RN to call with changes.    Karli Johnson MD PGY-1  [phone]

## 2019-11-14 NOTE — PROGRESS NOTE ADULT - ASSESSMENT
[ASSESSMENT and  PLAN]  Febrile illness /sp travel to [facility] 1wk prior. Suspect infectious causes to pt sx and lab abn.   Leukopenia and thrombocytopenia with improvement in leukopenia since admission. .   Plts low at 15 improved to 26.   Stool cx with EPEC [Enteropathogenic E.coli]  B12 and folate adequate      No evidence for bleeding.   No evidence for hemolysis, nor MAHA picture.     Doubt ITP, as more likely infectious :given fevers, and EPEC.   Worsening LFTs, may be due underlying infectious cause vs APAP use.     Dehydration with hemoconcentration due to diarrhea.   Low BUN.   Continue IVF      RECOMMENDATIONS  Follow CBC    ID consultation appreciated.   On abx for EPEC or other possible infectious cause.   Continue folate and B12 supplementation. Low down side.     IVF  encourage PO fluids.     DVT Prophylaxis: OOB. SCD.     Thank you for consulting us.     I have discussed the above plan of care with patient in detail. They expressed understanding of the treatment plan . Risks, benefits and alternatives discussed in detail. I have asked if they have any questions or concerns and appropriately addressed them; all questions answered to their satisfactions and in lay terms.

## 2019-11-14 NOTE — PROGRESS NOTE ADULT - PROBLEM SELECTOR PLAN 2
HIDA scan negative  surgical eval noted, no intervention  unclear as to why patient developed ascites  to d/w IR re: feasibility of paracentesis given paucity of fluid when optimized HIDA scan negative  surgical eval noted, no intervention  unclear as to why patient developed ascites  insufficient fluid for tap  trend lfts, likely 2/2 sepsis

## 2019-11-14 NOTE — PROGRESS NOTE ADULT - ASSESSMENT
25yo M, with no significant PMH, c/o of recurring fever and abdominal pain a/w diarrhea, nausea, vomiting with streaks of blood, petechiae, myalgia, and arthralgia after returning from [facility], admitted for thrombocytopenia and possible gastroenteritis.

## 2019-11-14 NOTE — PROVIDER CONTACT NOTE (OTHER) - SITUATION
Pt has a BP of 92/58 (manually), all other VSS.  Pt is currently on fluids.  As per pt has been experiecing diarrhea throughout the night when going to the bathroom.

## 2019-11-14 NOTE — PROGRESS NOTE ADULT - PROBLEM SELECTOR PLAN 1
- Severe thrombocytopenia, and leucopenia little better.  suspect  secondary to dengue vs some viral illness. C/w supportive Rx.  CT head with no evidence of spontaneous intracranial hemorrhage  hem/onc, and ID Consult noted  - hold platelet transfusion for now as no active signs of bleeding

## 2019-11-14 NOTE — PROGRESS NOTE ADULT - SUBJECTIVE AND OBJECTIVE BOX
[facility], Division of Infectious Diseases  JESUS MANUEL Reeves A. Lee  [phone]    Name: LEATHA HICKMAN  Age: 24y  Gender: Male  MRN: [id number]    Interval History--  Notes reviewed. Feels ok. Stool semi-formed, no blood. Denies fevers, chills, or rigors. Mild RUQ pain. No other new issues.     Past Medical History--  No pertinent past medical history  No significant past surgical history      For details regarding the patient's social history, family history, and other miscellaneous elements, please refer the initial infectious diseases consultation and/or the admitting history and physical examination for this admission.    Allergies    No Known Allergies    Intolerances        Medications--  Antibiotics:  cefTRIAXone   IVPB 2000 milliGRAM(s) IV Intermittent every 24 hours    Immunologic:  influenza   Vaccine 0.5 milliLiter(s) IntraMuscular once    Other:  acetaminophen   Tablet .. PRN  cyanocobalamin  multivitamin  ondansetron Injectable PRN  pantoprazole    Tablet  senna PRN  sodium chloride 0.9%.      Review of Systems--  A 10-point review of systems was obtained.   Review of systems otherwise unchanged compared to prior visit except as previously noted.    Physical Examination--  Vital Signs: T(F): 98.6 (11-14-19 @ 04:39), Max: 99.2 (11-13-19 @ 15:26)  HR: 59 (11-14-19 @ 04:39)  BP: 92/58 (11-14-19 @ 04:39)  RR: 17 (11-14-19 @ 04:39)  SpO2: 100% (11-14-19 @ 04:39)  Wt(kg): --  General: Nontoxic-appearing Male in no acute distress.  HEENT: AT/NC. Anicteric. Conjunctiva pink and moist. Oropharynx clear.  Neck: Not rigid. No sense of mass.  Nodes: None palpable.  Lungs: Clear bilaterally without rales, wheezing or rhonchi  Heart: Regular rate and rhythm. No Murmur. No rub. No gallop. No palpable thrill.  Abdomen: Bowel sounds present and normoactive. Soft. Nondistended. Mild midepigastric tenderness without guarding or rebound. No sense of mass. No organomegaly.  Extremities: No cyanosis or clubbing. No edema.   Skin: Warm. Dry. Good turgor. Rash fading. No vasculitic stigmata.  Psychiatric: Appropriate affect and mood for situation.       Laboratory Studies--  CBC                        15.4   3.15  )-----------( 26       ( 14 Nov 2019 08:24 )             43.9       Chemistries  11-14    139  |  104  |  5<L>  ----------------------------<  96  4.0   |  31  |  0.85    Ca    7.9<L>      14 Nov 2019 08:24    TPro  6.0  /  Alb  2.9<L>  /  TBili  0.6  /  DBili  x   /  AST  186<H>  /  ALT  128<H>  /  AlkPhos  64  11-14      Culture Data    Culture - Stool (collected 13 Nov 2019 11:56)  Source: .Stool Feces  Preliminary Report (14 Nov 2019 11:39):    No enteric pathogens to date: Final culture pending    GI PCR Panel, Stool (collected 13 Nov 2019 11:56)  Source: .Stool Feces  Final Report (13 Nov 2019 15:49):    Enteropathogenic E. coli (EPEC)    DETECTED by PCR    *******Please Note:*******    GI panel PCR evaluates for:    Campylobacter, Plesiomonas shigelloides, Salmonella,    Vibrio, Yersinia enterocolitica, Enteroaggregative    Escherichia coli (EAEC), Enteropathogenic E.coli (EPEC),    Enterotoxigenic E. coli (ETEC) lt/st, Shiga-like    toxin-producing E. coli (STEC) stx1/stx2,    Shigella/ Enteroinvasive E. coli (EIEC), Cryptosporidium,    Cyclospora cayetanensis, Entamoeba histolytica,    Giardia lamblia, AdenovirusF 40/41, Astrovirus,    Norovirus GI/GII, Rotavirus A, Sapovirus

## 2019-11-15 DIAGNOSIS — R10.9 UNSPECIFIED ABDOMINAL PAIN: ICD-10-CM

## 2019-11-15 LAB
ANION GAP SERPL CALC-SCNC: 4 MMOL/L — LOW (ref 5–17)
BUN SERPL-MCNC: 6 MG/DL — LOW (ref 7–23)
CALCIUM SERPL-MCNC: 8.7 MG/DL — SIGNIFICANT CHANGE UP (ref 8.5–10.1)
CHLORIDE SERPL-SCNC: 106 MMOL/L — SIGNIFICANT CHANGE UP (ref 96–108)
CO2 SERPL-SCNC: 30 MMOL/L — SIGNIFICANT CHANGE UP (ref 22–31)
CREAT SERPL-MCNC: 0.78 MG/DL — SIGNIFICANT CHANGE UP (ref 0.5–1.3)
GLUCOSE SERPL-MCNC: 90 MG/DL — SIGNIFICANT CHANGE UP (ref 70–99)
HAV IGM SER-ACNC: SIGNIFICANT CHANGE UP
HBV CORE IGM SER-ACNC: SIGNIFICANT CHANGE UP
HBV SURFACE AG SER-ACNC: SIGNIFICANT CHANGE UP
HCT VFR BLD CALC: 42.4 % — SIGNIFICANT CHANGE UP (ref 39–50)
HCV AB S/CO SERPL IA: 0.13 S/CO — SIGNIFICANT CHANGE UP (ref 0–0.99)
HCV AB SERPL-IMP: SIGNIFICANT CHANGE UP
HGB BLD-MCNC: 15.1 G/DL — SIGNIFICANT CHANGE UP (ref 13–17)
MCHC RBC-ENTMCNC: 31.1 PG — SIGNIFICANT CHANGE UP (ref 27–34)
MCHC RBC-ENTMCNC: 35.6 GM/DL — SIGNIFICANT CHANGE UP (ref 32–36)
MCV RBC AUTO: 87.2 FL — SIGNIFICANT CHANGE UP (ref 80–100)
NRBC # BLD: 0 /100 WBCS — SIGNIFICANT CHANGE UP (ref 0–0)
PLATELET # BLD AUTO: 56 K/UL — LOW (ref 150–400)
POTASSIUM SERPL-MCNC: 4.1 MMOL/L — SIGNIFICANT CHANGE UP (ref 3.5–5.3)
POTASSIUM SERPL-SCNC: 4.1 MMOL/L — SIGNIFICANT CHANGE UP (ref 3.5–5.3)
RBC # BLD: 4.86 M/UL — SIGNIFICANT CHANGE UP (ref 4.2–5.8)
RBC # FLD: 11.8 % — SIGNIFICANT CHANGE UP (ref 10.3–14.5)
SODIUM SERPL-SCNC: 140 MMOL/L — SIGNIFICANT CHANGE UP (ref 135–145)
WBC # BLD: 3.57 K/UL — LOW (ref 3.8–10.5)
WBC # FLD AUTO: 3.57 K/UL — LOW (ref 3.8–10.5)

## 2019-11-15 PROCEDURE — 99233 SBSQ HOSP IP/OBS HIGH 50: CPT

## 2019-11-15 PROCEDURE — 99231 SBSQ HOSP IP/OBS SF/LOW 25: CPT

## 2019-11-15 RX ORDER — PANTOPRAZOLE SODIUM 20 MG/1
40 TABLET, DELAYED RELEASE ORAL
Refills: 0 | Status: DISCONTINUED | OUTPATIENT
Start: 2019-11-15 | End: 2019-11-17

## 2019-11-15 RX ORDER — SUCRALFATE 1 G
1 TABLET ORAL
Refills: 0 | Status: DISCONTINUED | OUTPATIENT
Start: 2019-11-15 | End: 2019-11-17

## 2019-11-15 RX ADMIN — CEFTRIAXONE 100 MILLIGRAM(S): 500 INJECTION, POWDER, FOR SOLUTION INTRAMUSCULAR; INTRAVENOUS at 13:43

## 2019-11-15 RX ADMIN — PANTOPRAZOLE SODIUM 40 MILLIGRAM(S): 20 TABLET, DELAYED RELEASE ORAL at 05:12

## 2019-11-15 RX ADMIN — PANTOPRAZOLE SODIUM 40 MILLIGRAM(S): 20 TABLET, DELAYED RELEASE ORAL at 18:04

## 2019-11-15 RX ADMIN — Medication 1 TABLET(S): at 13:40

## 2019-11-15 RX ADMIN — PREGABALIN 1000 MICROGRAM(S): 225 CAPSULE ORAL at 13:39

## 2019-11-15 RX ADMIN — Medication 1 GRAM(S): at 18:04

## 2019-11-15 NOTE — PROGRESS NOTE ADULT - ASSESSMENT
Presentation most suggestive of Dengue  Now also with EPEC in stool. Would explain diarrhea but not likely to explain other symptoms.  No real concern of hemolysis. D/W Dr. Cunha (H/O) no concern of TTP/HUS (and not the typical strain of E. coli for it either)  Diarrhea better  Suspect midepigastric discomfort related to motrin/gastritis  Now with LFT elevations which could be related to tylenol ingestion but suspect related to Dengue  Appears to have defervesced  Surgery note reviewed, ?Perc Joycelyn if deteriorates. Not convinced GB is a primary issue here. Acalculous cholecystitis can occur after critical illness, but HIDA negative. False negative HIDA scans are rare, but reported.    Suggestions--  Continue Ceftriaxone  Serial exams  Trend fever curve  IVF  F/U CBC  Await Dengue serology  Surgical follow up

## 2019-11-15 NOTE — PROGRESS NOTE ADULT - PROBLEM SELECTOR PLAN 1
improving  cdiff neg, gi pcr cw epec  abx per id, f/u recs, further w/u in progress  ivf/diet as tolerated  monitor exam/gi function improving  cdiff neg, gi pcr cw epec  abx per id, f/u recs, further w/u in progress  ivf/diet as tolerated  monitor gi function

## 2019-11-15 NOTE — PROGRESS NOTE ADULT - SUBJECTIVE AND OBJECTIVE BOX
Patient is a 24y old  Male who presents with a chief complaint of fevers (13 Nov 2019 11:33)      INTERVAL HPI: Pt seen and examined bedside, has no complaints. Pt has no fever, N/v CP or SOB. has less epigastric tenderness. diarrhea improving.   OVERNIGHT EVENTS:  Vital Signs Last 24 Hrs  T(C): 37.1 (15 Nov 2019 12:26), Max: 37.2 (14 Nov 2019 20:42)  T(F): 98.8 (15 Nov 2019 12:26), Max: 99 (14 Nov 2019 20:42)  HR: 59 (15 Nov 2019 12:26) (59 - 64)  BP: 104/67 (15 Nov 2019 12:26) (104/67 - 110/76)  BP(mean): --  RR: 18 (15 Nov 2019 12:26) (17 - 18)  SpO2: 99% (15 Nov 2019 12:26) (98% - 99%)    Review of System:    REVIEW OF SYSTEMS  Constitutional: No fever, chills, fatigue  Neuro: No headache, numbness, weakness  Resp: No cough, wheezing, shortness of breath  CVS: No chest pain, palpitations, leg swelling  GI: +epigastric and RUQ pain, no nausea, vomiting, diarrhea   : No dysuria, frequency, incontinence  Skin: No itching, burning, rashes, or lesions   Msk: No joint pain or swelling  Psych: No depression, anxiety, mood swings      PHYSICAL EXAM:  GENERAL: NAD, well-groomed, well-developed  HEAD:  Atraumatic, Normocephalic  EYES: EOMI, PERRLA, conjunctiva and sclera clear  ENMT:  Moist mucous membranes  NECK: Supple, No JVD, Normal thyroid  HEART: Regular rate and rhythm; No murmurs, rubs, or gallops  RESPIRATORY: CTA B/L, No wheezing/ rhonchi  ABDOMEN: Soft, +RUQ tenderness, Nondistended; Bowel sounds present  NEUROLOGY: A&Ox3, nonfocal, CN II-XII grossly intact, sensation intact  EXTREMITIES:  2+ Peripheral Pulses, No clubbing, cyanosis, or edema  SKIN: warm, dry, normal color, no rash or abnormal lesions      LABS:                        15.1   3.57  )-----------( 56       ( 15 Nov 2019 09:00 )             42.4     15 Nov 2019 09:00    140    |  106    |  6      ----------------------------<  90     4.1     |  30     |  0.78     Ca    8.7        15 Nov 2019 09:00          CAPILLARY BLOOD GLUCOSE        UCx       RADIOLOGY & ADDITIONAL TESTS:            11-13 @ 11:56   Enteropathogenic E. coli (EPEC)  DETECTED by PCR  *******Please Note:*******  GI panel PCR evaluates for:  Campylobacter, Plesiomonas shigelloides, Salmonella,  Vibrio, Yersinia enterocolitica, Enteroaggregative  Escherichia coli (EAEC), Enteropathogenic E.coli (EPEC),  Enterotoxigenic E. coli (ETEC) lt/st, Shiga-like  toxin-producing E. coli (STEC) stx1/stx2,  Shigella/ Enteroinvasive E. coli (EIEC), Cryptosporidium,  Cyclospora cayetanensis, Entamoeba histolytica,  Giardia lamblia, AdenovirusF 40/41, Astrovirus,  Norovirus GI/GII, Rotavirus A, Sapovirus  --  --          Diet:    RADIOLOGY & ADDITIONAL TESTS:    Imaging Personally Reviewed:  [x] YES  [ ] NO    Consultant(s) Notes Reviewed:  [x ] YES  [ ] NO    MEDICATIONS  (STANDING):  cefTRIAXone   IVPB 2000 milliGRAM(s) IV Intermittent every 24 hours  cyanocobalamin 1000 MICROGram(s) Oral daily  dextrose 5% + sodium chloride 0.45%. 2000 milliLiter(s) (150 mL/Hr) IV Continuous <Continuous>  influenza   Vaccine 0.5 milliLiter(s) IntraMuscular once  multivitamin 1 Tablet(s) Oral daily    MEDICATIONS  (PRN):  acetaminophen   Tablet .. 650 milliGRAM(s) Oral every 6 hours PRN Temp greater or equal to 38C (100.4F), Mild Pain (1 - 3)  ondansetron Injectable 4 milliGRAM(s) IV Push every 8 hours PRN Nausea and/or Vomiting  senna 2 Tablet(s) Oral at bedtime PRN Constipation        Disposition:    DVT Prophylaxis:  Subcu Heparin [  ]     LMWH [ ]     Coumadin [ ]    Xaeralto [ ]    Eliquis [ ]   Venodyne pumps [x ]    Discussed with Patient [x ]     Family [x ]          [ ]   RN[ x]      [ ]    Advance Directives:      Palliative Care:    Care Discussed with Consultants/Other Providers [x ] YES  [ ] NO

## 2019-11-15 NOTE — PROGRESS NOTE ADULT - PROBLEM SELECTOR PLAN 2
HIDA scan negative  surgical eval noted, no intervention  unclear as to why patient developed ascites  insufficient fluid for tap  trend lfts, likely 2/2 sepsis multifactorial  HIDA scan negative  surgical eval noted, no intervention  unclear as to why patient developed ascites; insufficient fluid for tap  trend lfts, likely 2/2 sepsis  prn pain control  monitor exam and for need of further intervention pending clinical course multifactorial  HIDA scan negative  surgical eval noted, no intervention  unclear as to why patient developed ascites; insufficient fluid for tap  trend lfts, likely in setting of infection  prn pain control  monitor exam and for need of further intervention pending clinical course multifactorial  HIDA scan negative  surgical eval noted, no intervention  unclear as to why patient developed ascites; insufficient fluid for tap  trend lfts, likely in setting of infection  ppi/carafate  prn pain control  monitor exam and for need of further intervention pending clinical course

## 2019-11-15 NOTE — PROGRESS NOTE ADULT - SUBJECTIVE AND OBJECTIVE BOX
INTERVAL HPI/OVERNIGHT EVENTS:  pt seen and examined  denies n/v  still w upper abd discomfort  reports 2 episodes of loose, brown stool yesterday  tolerating po  labs pending    MEDICATIONS  (STANDING):  cefTRIAXone   IVPB 2000 milliGRAM(s) IV Intermittent every 24 hours  cyanocobalamin 1000 MICROGram(s) Oral daily  influenza   Vaccine 0.5 milliLiter(s) IntraMuscular once  multivitamin 1 Tablet(s) Oral daily  pantoprazole    Tablet 40 milliGRAM(s) Oral before breakfast  sodium chloride 0.9%. 1000 milliLiter(s) (100 mL/Hr) IV Continuous <Continuous>    MEDICATIONS  (PRN):  acetaminophen   Tablet .. 650 milliGRAM(s) Oral every 6 hours PRN Temp greater or equal to 38C (100.4F), Mild Pain (1 - 3)  ondansetron Injectable 4 milliGRAM(s) IV Push every 8 hours PRN Nausea and/or Vomiting  senna 2 Tablet(s) Oral at bedtime PRN Constipation      Allergies    No Known Allergies    Intolerances        Review of Systems:    General:  No wt loss, fevers, chills, night sweats, fatigue   Eyes:  Good vision, no reported pain  ENT:  No sore throat, pain, runny nose, dysphagia  CV:  No pain, palpitations, hypo/hypertension  Resp:  No dyspnea, cough, tachypnea, wheezing  GI:  +pain, No nausea, No vomiting, +diarrhea, No constipation, No weight loss, No fever, No pruritis, No rectal bleeding, No melena, No dysphagia  :  No pain, bleeding, incontinence, nocturia  Muscle:  No pain, weakness  Neuro:  No weakness, tingling, memory problems  Psych:  No fatigue, insomnia, mood problems, depression  Endocrine:  No polyuria, polydypsia, cold/heat intolerance  Heme:  No petechiae, ecchymosis, easy bruisability  Skin:  No rash, tattoos, scars, edema      Vital Signs Last 24 Hrs  T(C): 36.7 (15 Nov 2019 04:58), Max: 37.2 (14 Nov 2019 20:42)  T(F): 98.1 (15 Nov 2019 04:58), Max: 99 (14 Nov 2019 20:42)  HR: 64 (15 Nov 2019 04:58) (60 - 64)  BP: 105/68 (15 Nov 2019 04:58) (95/62 - 110/76)  BP(mean): --  RR: 17 (15 Nov 2019 04:58) (17 - 17)  SpO2: 99% (15 Nov 2019 04:58) (98% - 99%)    PHYSICAL EXAM:    Constitutional: NAD  HEENT: ncat  Respiratory: dec bs  Cardiovascular: S1 and S2, RRR  Gastrointestinal: soft minimal ttp b/l upper quadrants no guarding mild dt  Extremities: No peripheral edema  Neurological: A/O x 3      LABS:                        15.4   3.15  )-----------( 26       ( 14 Nov 2019 08:24 )             43.9     11-14    139  |  104  |  5<L>  ----------------------------<  96  4.0   |  31  |  0.85    Ca    7.9<L>      14 Nov 2019 08:24    TPro  6.0  /  Alb  2.9<L>  /  TBili  0.6  /  DBili  x   /  AST  186<H>  /  ALT  128<H>  /  AlkPhos  64  11-14          RADIOLOGY & ADDITIONAL TESTS:

## 2019-11-15 NOTE — PROGRESS NOTE ADULT - ASSESSMENT
[ASSESSMENT and  PLAN]  Febrile illness /sp travel to [facility] 1wk prior. Suspect infectious causes to pt sx and lab abn.   Leukopenia and thrombocytopenia with improvement in leukopenia since admission. .   Plts low at 15 improved to 55  Stool cx with EPEC [Enteropathogenic E.coli]  B12 and folate adequate      No evidence for bleeding.   No evidence for hemolysis, nor MAHA picture.     Doubt ITP, as more likely infectious :given fevers, and EPEC.   Worsening LFTs, may be due underlying infectious cause vs APAP use.     Dehydration with hemoconcentration due to diarrhea.   Low BUN.   Continue IVF      RECOMMENDATIONS  ID consultation appreciated.   On abx for EPEC or other possible infectious cause.   Continue folate and B12 supplementation. Low down side.     IVF  encourage PO fluids.     DVT Prophylaxis: OOB. SCD.     continue ID evaluation  follow CBC

## 2019-11-15 NOTE — PROGRESS NOTE ADULT - SUBJECTIVE AND OBJECTIVE BOX
[facility], Division of Infectious Diseases  JESUS MANUEL Reeves A. Lee  [phone]    Name: LEATHA HICKMAN  Age: 24y  Gender: Male  MRN: [id number]    Interval History--  Notes reviewed. Feels ok.  Still with RUQ/midepigastric pain.  Surgical note reviewed  No fevers.    No LFT today.  No blood cx ever done.     Past Medical History--  No pertinent past medical history  No significant past surgical history    For details regarding the patient's social history, family history, and other miscellaneous elements, please refer the initial infectious diseases consultation and/or the admitting history and physical examination for this admission.    Allergies  No Known Allergies    Intolerances        Medications--  Antibiotics: cefTRIAXone   IVPB 2000 milliGRAM(s) IV Intermittent every 24 hours    Immunologic:  influenza   Vaccine 0.5 milliLiter(s) IntraMuscular once    Other:  acetaminophen   Tablet .. PRN  cyanocobalamin  multivitamin  ondansetron Injectable PRN  pantoprazole    Tablet  senna PRN  sodium chloride 0.9%.  sucralfate      Review of Systems--  A 10-point review of systems was obtained.   Review of systems otherwise unchanged compared to prior visit except as previously noted.    Physical Examination--  Vital Signs: T(F): 98.8 (11-15-19 @ 12:26), Max: 99 (11-14-19 @ 20:42)  HR: 59 (11-15-19 @ 12:26)  BP: 104/67 (11-15-19 @ 12:26)  RR: 18 (11-15-19 @ 12:26)  SpO2: 99% (11-15-19 @ 12:26)  Wt(kg): --  General: Nontoxic-appearing Male in no acute distress.  HEENT: AT/NC. Anicteric. Conjunctiva pink and moist. Oropharynx clear.  Neck: Not rigid. No sense of mass.  Nodes: None palpable.  Lungs: Clear bilaterally without rales, wheezing or rhonchi  Heart: Regular rate and rhythm. No Murmur. No rub. No gallop. No palpable thrill.  Abdomen: Bowel sounds present and normoactive. Soft. Nondistended. Midepigastric-RUQ tenderness without guarding or rebound.   Extremities: No cyanosis or clubbing. No edema.   Skin: Warm. Dry. Good turgor. Rash nearly resolved. No vasculitic stigmata.  Psychiatric: Appropriate affect and mood for situation.         Laboratory Studies--  CBC                        15.1   3.57  )-----------( 56       ( 15 Nov 2019 09:00 )             42.4       Chemistries  11-15    140  |  106  |  6<L>  ----------------------------<  90  4.1   |  30  |  0.78    Ca    8.7      15 Nov 2019 09:00    TPro  6.0  /  Alb  2.9<L>  /  TBili  0.6  /  DBili  x   /  AST  186<H>  /  ALT  128<H>  /  AlkPhos  64  11-14      Culture Data    Culture - Stool (collected 13 Nov 2019 11:56)  Source: .Stool Feces  Preliminary Report (14 Nov 2019 11:39):    No enteric pathogens to date: Final culture pending    GI PCR Panel, Stool (collected 13 Nov 2019 11:56)  Source: .Stool Feces  Final Report (13 Nov 2019 15:49):    Enteropathogenic E. coli (EPEC)    DETECTED by PCR    *******Please Note:*******    GI panel PCR evaluates for:    Campylobacter, Plesiomonas shigelloides, Salmonella,    Vibrio, Yersinia enterocolitica, Enteroaggregative    Escherichia coli (EAEC), Enteropathogenic E.coli (EPEC),    Enterotoxigenic E. coli (ETEC) lt/st, Shiga-like    toxin-producing E. coli (STEC) stx1/stx2,    Shigella/ Enteroinvasive E. coli (EIEC), Cryptosporidium,    Cyclospora cayetanensis, Entamoeba histolytica,    Giardia lamblia, AdenovirusF 40/41, Astrovirus,    Norovirus GI/GII, Rotavirus A, Sapovirus

## 2019-11-15 NOTE — PROGRESS NOTE ADULT - SUBJECTIVE AND OBJECTIVE BOX
Interval History:  states diarrhea is better.  continues with ID workup  Chart reviewed and events noted;   Overnight events:    MEDICATIONS  (STANDING):  cefTRIAXone   IVPB 2000 milliGRAM(s) IV Intermittent every 24 hours  cyanocobalamin 1000 MICROGram(s) Oral daily  influenza   Vaccine 0.5 milliLiter(s) IntraMuscular once  multivitamin 1 Tablet(s) Oral daily  pantoprazole    Tablet 40 milliGRAM(s) Oral two times a day  sodium chloride 0.9%. 1000 milliLiter(s) (100 mL/Hr) IV Continuous <Continuous>  sucralfate 1 Gram(s) Oral two times a day    MEDICATIONS  (PRN):  acetaminophen   Tablet .. 650 milliGRAM(s) Oral every 6 hours PRN Temp greater or equal to 38C (100.4F), Mild Pain (1 - 3)  ondansetron Injectable 4 milliGRAM(s) IV Push every 8 hours PRN Nausea and/or Vomiting  senna 2 Tablet(s) Oral at bedtime PRN Constipation      Vital Signs Last 24 Hrs  T(C): 37.1 (15 Nov 2019 12:26), Max: 37.2 (14 Nov 2019 20:42)  T(F): 98.8 (15 Nov 2019 12:26), Max: 99 (14 Nov 2019 20:42)  HR: 59 (15 Nov 2019 12:26) (59 - 64)  BP: 104/67 (15 Nov 2019 12:26) (104/67 - 110/76)  BP(mean): --  RR: 18 (15 Nov 2019 12:26) (17 - 18)  SpO2: 99% (15 Nov 2019 12:26) (98% - 99%)    PHYSICAL EXAM  General: adult in NAD  HEENT: clear oropharynx, anicteric sclera, pink conjunctivae  Neck: supple  CV: normal S1S2 with no murmur rubs or gallops  Lungs: clear to auscultation, no wheezes, no rhales  Abdomen: soft non-tender non-distended, no hepato/splenomegaly  Ext: no clubbing cyanosis or edema  Skin: no rashes and no petichiae  Neuro: alert and oriented X3 no focal deficits      LABS:  CBC Full  -  ( 15 Nov 2019 09:00 )  WBC Count : 3.57 K/uL  RBC Count : 4.86 M/uL  Hemoglobin : 15.1 g/dL  Hematocrit : 42.4 %  Platelet Count - Automated : 56 K/uL  Mean Cell Volume : 87.2 fl  Mean Cell Hemoglobin : 31.1 pg  Mean Cell Hemoglobin Concentration : 35.6 gm/dL  Auto Neutrophil # : x  Auto Lymphocyte # : x  Auto Monocyte # : x  Auto Eosinophil # : x  Auto Basophil # : x  Auto Neutrophil % : x  Auto Lymphocyte % : x  Auto Monocyte % : x  Auto Eosinophil % : x  Auto Basophil % : x    11-15    140  |  106  |  6<L>  ----------------------------<  90  4.1   |  30  |  0.78    Ca    8.7      15 Nov 2019 09:00    TPro  6.0  /  Alb  2.9<L>  /  TBili  0.6  /  DBili  x   /  AST  186<H>  /  ALT  128<H>  /  AlkPhos  64  11-14        fe studies      WBC trend  3.57 K/uL (11-15-19 @ 09:00)  3.15 K/uL (11-14-19 @ 08:24)  4.10 K/uL (11-13-19 @ 04:19)      Hgb trend  15.1 g/dL (11-15-19 @ 09:00)  15.4 g/dL (11-14-19 @ 08:24)  16.4 g/dL (11-13-19 @ 04:19)      plt trend  56 K/uL (11-15-19 @ 09:00)  26 K/uL (11-14-19 @ 08:24)  15 K/uL (11-13-19 @ 04:19)        RADIOLOGY & ADDITIONAL STUDIES:

## 2019-11-15 NOTE — PROGRESS NOTE ADULT - ASSESSMENT
25yo M, with no significant PMH, c/o of recurring fever and abdominal pain a/w diarrhea, nausea, vomiting with streaks of blood, petechiae, myalgia, and arthralgia after returning from [facility], admitted for thrombocytopenia and possible gastroenteritis.

## 2019-11-15 NOTE — PROGRESS NOTE ADULT - SUBJECTIVE AND OBJECTIVE BOX
HOSPITAL DAY: 3    SUBJECTIVE:  Patient seen and examined at bedside.  No overnight events.  Patient with no new complaints at this time, tolerating low fiber diet, admits to flatus and diarrhea bowel movement. Admits to 7/10 non-improving abd pain.  Patient denies any fevers, chills, chest pain, shortness of breath, nausea, vomiting.    Vital Signs Last 24 Hrs  T(C): 36.7 (15 Nov 2019 04:58), Max: 37.2 (14 Nov 2019 20:42)  T(F): 98.1 (15 Nov 2019 04:58), Max: 99 (14 Nov 2019 20:42)  HR: 64 (15 Nov 2019 04:58) (60 - 64)  BP: 105/68 (15 Nov 2019 04:58) (95/62 - 110/76)  BP(mean): --  RR: 17 (15 Nov 2019 04:58) (17 - 17)  SpO2: 99% (15 Nov 2019 04:58) (98% - 99%)    PHYSICAL EXAM:  GENERAL: No acute distress, well-developed  HEAD:  Atraumatic, Normocephalic  ABDOMEN: Soft, mildly-tender RUQ, non-distended; bowel sounds+  NEUROLOGY: A&O x 3, no focal deficits    I&O's Summary    14 Nov 2019 07:01  -  15 Nov 2019 07:00  --------------------------------------------------------  IN: 1300 mL / OUT: 0 mL / NET: 1300 mL      I&O's Detail    14 Nov 2019 07:01  -  15 Nov 2019 07:00  --------------------------------------------------------  IN:    sodium chloride 0.9%.: 1300 mL  Total IN: 1300 mL    OUT:  Total OUT: 0 mL    Total NET: 1300 mL        MEDICATIONS  (STANDING):  cefTRIAXone   IVPB 2000 milliGRAM(s) IV Intermittent every 24 hours  cyanocobalamin 1000 MICROGram(s) Oral daily  influenza   Vaccine 0.5 milliLiter(s) IntraMuscular once  multivitamin 1 Tablet(s) Oral daily  pantoprazole    Tablet 40 milliGRAM(s) Oral before breakfast  sodium chloride 0.9%. 1000 milliLiter(s) (100 mL/Hr) IV Continuous <Continuous>    MEDICATIONS  (PRN):  acetaminophen   Tablet .. 650 milliGRAM(s) Oral every 6 hours PRN Temp greater or equal to 38C (100.4F), Mild Pain (1 - 3)  ondansetron Injectable 4 milliGRAM(s) IV Push every 8 hours PRN Nausea and/or Vomiting  senna 2 Tablet(s) Oral at bedtime PRN Constipation    LABS:                        15.4   3.15  )-----------( 26       ( 14 Nov 2019 08:24 )             43.9     11-14    139  |  104  |  5<L>  ----------------------------<  96  4.0   |  31  |  0.85    Ca    7.9<L>      14 Nov 2019 08:24    ASSESSMENT    24y Male with admitted for gastroenteritis with minimal improvement in pain.    PLAN  - Will discuss with Dr. Sutherland  - pain control, supportive care  - OOB  - low fiber   - serial abdominal exams  - labs in am  - care per primary team    Surgical Team Contact Information  Spectralink: Ext: [phone] or [phone]  Pager: [phone]

## 2019-11-15 NOTE — PROGRESS NOTE ADULT - PROBLEM SELECTOR PLAN 1
Severe thrombocytopenia, and leucopenia improving  suspect  secondary to dengue vs some viral illness. C/w supportive Rx.  CT head with no evidence of spontaneous intracranial hemorrhage  hem/onc, and ID Consult noted

## 2019-11-16 DIAGNOSIS — R74.0 NONSPECIFIC ELEVATION OF LEVELS OF TRANSAMINASE AND LACTIC ACID DEHYDROGENASE [LDH]: ICD-10-CM

## 2019-11-16 LAB
ALBUMIN SERPL ELPH-MCNC: 3 G/DL — LOW (ref 3.3–5)
ALP SERPL-CCNC: 54 U/L — SIGNIFICANT CHANGE UP (ref 40–120)
ALT FLD-CCNC: 89 U/L — HIGH (ref 12–78)
ANION GAP SERPL CALC-SCNC: 6 MMOL/L — SIGNIFICANT CHANGE UP (ref 5–17)
AST SERPL-CCNC: 79 U/L — HIGH (ref 15–37)
BILIRUB SERPL-MCNC: 0.5 MG/DL — SIGNIFICANT CHANGE UP (ref 0.2–1.2)
BUN SERPL-MCNC: 8 MG/DL — SIGNIFICANT CHANGE UP (ref 7–23)
CALCIUM SERPL-MCNC: 8.7 MG/DL — SIGNIFICANT CHANGE UP (ref 8.5–10.1)
CHLORIDE SERPL-SCNC: 106 MMOL/L — SIGNIFICANT CHANGE UP (ref 96–108)
CO2 SERPL-SCNC: 28 MMOL/L — SIGNIFICANT CHANGE UP (ref 22–31)
CREAT SERPL-MCNC: 0.81 MG/DL — SIGNIFICANT CHANGE UP (ref 0.5–1.3)
CULTURE RESULTS: SIGNIFICANT CHANGE UP
GLUCOSE SERPL-MCNC: 93 MG/DL — SIGNIFICANT CHANGE UP (ref 70–99)
HCT VFR BLD CALC: 41.7 % — SIGNIFICANT CHANGE UP (ref 39–50)
HGB BLD-MCNC: 14.8 G/DL — SIGNIFICANT CHANGE UP (ref 13–17)
MCHC RBC-ENTMCNC: 30.8 PG — SIGNIFICANT CHANGE UP (ref 27–34)
MCHC RBC-ENTMCNC: 35.5 GM/DL — SIGNIFICANT CHANGE UP (ref 32–36)
MCV RBC AUTO: 86.9 FL — SIGNIFICANT CHANGE UP (ref 80–100)
NRBC # BLD: 0 /100 WBCS — SIGNIFICANT CHANGE UP (ref 0–0)
PLATELET # BLD AUTO: 107 K/UL — LOW (ref 150–400)
POTASSIUM SERPL-MCNC: 4.2 MMOL/L — SIGNIFICANT CHANGE UP (ref 3.5–5.3)
POTASSIUM SERPL-SCNC: 4.2 MMOL/L — SIGNIFICANT CHANGE UP (ref 3.5–5.3)
PROT SERPL-MCNC: 6.5 G/DL — SIGNIFICANT CHANGE UP (ref 6–8.3)
RBC # BLD: 4.8 M/UL — SIGNIFICANT CHANGE UP (ref 4.2–5.8)
RBC # FLD: 11.8 % — SIGNIFICANT CHANGE UP (ref 10.3–14.5)
SODIUM SERPL-SCNC: 140 MMOL/L — SIGNIFICANT CHANGE UP (ref 135–145)
SPECIMEN SOURCE: SIGNIFICANT CHANGE UP
WBC # BLD: 3.54 K/UL — LOW (ref 3.8–10.5)
WBC # FLD AUTO: 3.54 K/UL — LOW (ref 3.8–10.5)

## 2019-11-16 PROCEDURE — 99232 SBSQ HOSP IP/OBS MODERATE 35: CPT

## 2019-11-16 PROCEDURE — 99231 SBSQ HOSP IP/OBS SF/LOW 25: CPT

## 2019-11-16 RX ADMIN — Medication 1 GRAM(S): at 17:54

## 2019-11-16 RX ADMIN — PANTOPRAZOLE SODIUM 40 MILLIGRAM(S): 20 TABLET, DELAYED RELEASE ORAL at 05:09

## 2019-11-16 RX ADMIN — Medication 1 TABLET(S): at 12:03

## 2019-11-16 RX ADMIN — CEFTRIAXONE 100 MILLIGRAM(S): 500 INJECTION, POWDER, FOR SOLUTION INTRAMUSCULAR; INTRAVENOUS at 14:30

## 2019-11-16 RX ADMIN — PANTOPRAZOLE SODIUM 40 MILLIGRAM(S): 20 TABLET, DELAYED RELEASE ORAL at 17:54

## 2019-11-16 RX ADMIN — Medication 1 GRAM(S): at 05:09

## 2019-11-16 RX ADMIN — SODIUM CHLORIDE 100 MILLILITER(S): 9 INJECTION INTRAMUSCULAR; INTRAVENOUS; SUBCUTANEOUS at 05:10

## 2019-11-16 RX ADMIN — PREGABALIN 1000 MICROGRAM(S): 225 CAPSULE ORAL at 12:03

## 2019-11-16 NOTE — PROGRESS NOTE ADULT - PROBLEM SELECTOR PLAN 1
Severe thrombocytopenia, and leucopenia improving rapidly  suspect  secondary to dengue vs some viral illness. C/w supportive Rx.  CT head with no evidence of spontaneous intracranial hemorrhage  hem/onc, and ID Consult noted

## 2019-11-16 NOTE — PROGRESS NOTE ADULT - SUBJECTIVE AND OBJECTIVE BOX
HOSPITAL DAY: 4      SUBJECTIVE:  Patient seen and examined at bedside.  No overnight events.  Patient with no new complaints at this time, tolerating low fiber diet, admits to flatus and diarrhea bowel movement. Admits to 5-6/10 slightly improving abd pain.  Patient denies any fevers, chills, chest pain, shortness of breath, nausea, vomiting.    Tmax 98.9  VSS  O2 saturations of 98-99% on RA      PHYSICAL EXAM:  GENERAL: No acute distress, well-developed  HEAD:  Atraumatic, Normocephalic  Neck:  Supple with full ROM  Chest: Equal expansion bilaterally  CV: Pulse regular in rate and rhythm  ABDOMEN: Soft, mildly-tender in epigastrium without guarding or rebound or referred pain  NEUROLOGY: A&O x 3, no focal deficits      MEDICATIONS  (STANDING):  cefTRIAXone   IVPB 2000 milliGRAM(s) IV Intermittent every 24 hours  cyanocobalamin 1000 MICROGram(s) Oral daily  influenza   Vaccine 0.5 milliLiter(s) IntraMuscular once  multivitamin 1 Tablet(s) Oral daily  pantoprazole    Tablet 40 milliGRAM(s) Oral before breakfast  sodium chloride 0.9%. 1000 milliLiter(s) (100 mL/Hr) IV Continuous <Continuous>      MEDICATIONS  (PRN):  acetaminophen   Tablet .. 650 milliGRAM(s) Oral every 6 hours PRN Temp greater or equal to 38C (100.4F), Mild Pain (1 - 3)  ondansetron Injectable 4 milliGRAM(s) IV Push every 8 hours PRN Nausea and/or Vomiting  senna 2 Tablet(s) Oral at bedtime PRN Constipation    LABS:     Complete Blood Count in AM (11.16.19 @ 08:00)    Nucleated RBC: 0 /100 WBCs    WBC Count: 3.54 K/uL    RBC Count: 4.80 M/uL    Hemoglobin: 14.8 g/dL    Hematocrit: 41.7 %    Mean Cell Volume: 86.9 fl    Mean Cell Hemoglobin: 30.8 pg    Mean Cell Hemoglobin Conc: 35.5 gm/dL    Red Cell Distrib Width: 11.8 %    Platelet Count - Automated: 107: Smear Reviewed, Result Confirmed. Specimen integrity verified. K/uL            Comprehensive Metabolic Panel in AM (11.16.19 @ 08:00)    Sodium, Serum: 140 mmol/L    Potassium, Serum: 4.2 mmol/L    Chloride, Serum: 106 mmol/L    Carbon Dioxide, Serum: 28 mmol/L    Anion Gap, Serum: 6 mmol/L    Blood Urea Nitrogen, Serum: 8 mg/dL    Creatinine, Serum: 0.81 mg/dL    Glucose, Serum: 93 mg/dL    Calcium, Total Serum: 8.7 mg/dL    Protein Total, Serum: 6.5 g/dL    Albumin, Serum: 3.0 g/dL    Bilirubin Total, Serum: 0.5 mg/dL    Alkaline Phosphatase, Serum: 54 U/L    Aspartate Aminotransferase (AST/SGOT): 79 U/L    Alanine Aminotransferase (ALT/SGPT): 89 U/L    eGFR if Non : 123: Interpretative comment  The units for eGFR are mL/min/1.73M2 (normalized body surface area). The  eGFR is calculated from a serum creatinine using the CKD-EPI equation.  Other variables required for calculation are race, age and sex. Among  patients with chronic kidney disease (CKD), the eGFR is useful in  determining the stage of disease according to KDOQI CKD classification.  All eGFR results are reported numerically with the following  interpretation.          GFR                    With                 Without     (ml/min/1.73 m2)    Kidney Damage       Kidney Damage        >= 90                    Stage 1                     Normal        60-89                    Stage 2                     Decreased GFR        30-59     Stage 3                     Stage 3        15-29                    Stage 4                     Stage 4        < 15                      Stage 5                     Stage 5  Each stage of CKD assumes that the associated GFR level has been in  effect for at least 3 months. Determination of stages one and two (with  eGFR > 59 ml/min/m2) requires estimation of kidney damage for at least 3  months as defined by structural or functional abnormalities.  Limitations: All estimates of GFR will be less accurate for patients at  extremes of muscle mass (including but not limited to frail elderly,  critically ill, or cancer patients), those with unusual diets, and those  with conditions associated with reduced secretion or extrarenal  elimination of creatinine. The eGFR equation is not recommended for use  in patients with unstable creatinine levels. mL/min/1.73M2    eGFR if African American: 142 mL/min/1.73M2

## 2019-11-16 NOTE — PROGRESS NOTE ADULT - ASSESSMENT
Presentation most suggestive of Dengue  Now also with EPEC in stool. Would explain diarrhea but not likely to explain other symptoms.  No real concern of hemolysis.   Diarrhea better  Suspect midepigastric discomfort related to motrin/gastritis  Now with LFT elevations which could be related to tylenol ingestion but suspect related to Dengue  Appears to have defervesced

## 2019-11-16 NOTE — PROGRESS NOTE ADULT - SUBJECTIVE AND OBJECTIVE BOX
Patient is a 24y old  Male who presents with a chief complaint of fevers (13 Nov 2019 11:33)      INTERVAL HPI: Pt seen and examined bedside, has no complaints. Pt has no fever, N/v CP or SOB. has minimal epigastric pain. had one watery BM yesterday  OVERNIGHT EVENTS:  Vital Signs Last 24 Hrs  T(C): 37.2 (16 Nov 2019 13:39), Max: 37.2 (16 Nov 2019 13:39)  T(F): 98.9 (16 Nov 2019 13:39), Max: 98.9 (16 Nov 2019 13:39)  HR: 66 (16 Nov 2019 13:39) (59 - 66)  BP: 104/70 (16 Nov 2019 13:39) (104/70 - 107/74)  BP(mean): --  RR: 17 (16 Nov 2019 13:39) (17 - 18)  SpO2: 98% (16 Nov 2019 13:39) (98% - 98%)  Review of System:    REVIEW OF SYSTEMS  Constitutional: No fever, chills, fatigue  Neuro: No headache, numbness, weakness  Resp: No cough, wheezing, shortness of breath  CVS: No chest pain, palpitations, leg swelling  GI: +epigastric and RUQ pain, no nausea, vomiting, diarrhea   : No dysuria, frequency, incontinence  Skin: No itching, burning, rashes, or lesions   Msk: No joint pain or swelling  Psych: No depression, anxiety, mood swings      PHYSICAL EXAM:  GENERAL: NAD, well-groomed, well-developed  HEAD:  Atraumatic, Normocephalic  EYES: EOMI, PERRLA, conjunctiva and sclera clear  ENMT:  Moist mucous membranes  NECK: Supple, No JVD, Normal thyroid  HEART: Regular rate and rhythm; No murmurs, rubs, or gallops  RESPIRATORY: CTA B/L, No wheezing/ rhonchi  ABDOMEN: Soft, +RUQ tenderness, Nondistended; Bowel sounds present  NEUROLOGY: A&Ox3, nonfocal, CN II-XII grossly intact, sensation intact  EXTREMITIES:  2+ Peripheral Pulses, No clubbing, cyanosis, or edema  SKIN: warm, dry, normal color, no rash or abnormal lesions      LABS:                        14.8   3.54  )-----------( 107      ( 16 Nov 2019 08:00 )             41.7     16 Nov 2019 08:00    140    |  106    |  8      ----------------------------<  93     4.2     |  28     |  0.81     Ca    8.7        16 Nov 2019 08:00    TPro  6.5    /  Alb  3.0    /  TBili  0.5    /  DBili  x      /  AST  79     /  ALT  89     /  AlkPhos  54     16 Nov 2019 08:00        CAPILLARY BLOOD GLUCOSE        UCx       RADIOLOGY & ADDITIONAL TESTS:              11-13 @ 11:56   Enteropathogenic E. coli (EPEC)  DETECTED by PCR  *******Please Note:*******  GI panel PCR evaluates for:  Campylobacter, Plesiomonas shigelloides, Salmonella,  Vibrio, Yersinia enterocolitica, Enteroaggregative  Escherichia coli (EAEC), Enteropathogenic E.coli (EPEC),  Enterotoxigenic E. coli (ETEC) lt/st, Shiga-like  toxin-producing E. coli (STEC) stx1/stx2,  Shigella/ Enteroinvasive E. coli (EIEC), Cryptosporidium,  Cyclospora cayetanensis, Entamoeba histolytica,  Giardia lamblia, AdenovirusF 40/41, Astrovirus,  Norovirus GI/GII, Rotavirus A, Sapovirus  --  --          Diet:    RADIOLOGY & ADDITIONAL TESTS:    Imaging Personally Reviewed:  [x] YES  [ ] NO    Consultant(s) Notes Reviewed:  [x ] YES  [ ] NO    MEDICATIONS  (STANDING):  cefTRIAXone   IVPB 2000 milliGRAM(s) IV Intermittent every 24 hours  cyanocobalamin 1000 MICROGram(s) Oral daily  dextrose 5% + sodium chloride 0.45%. 2000 milliLiter(s) (150 mL/Hr) IV Continuous <Continuous>  influenza   Vaccine 0.5 milliLiter(s) IntraMuscular once  multivitamin 1 Tablet(s) Oral daily    MEDICATIONS  (PRN):  acetaminophen   Tablet .. 650 milliGRAM(s) Oral every 6 hours PRN Temp greater or equal to 38C (100.4F), Mild Pain (1 - 3)  ondansetron Injectable 4 milliGRAM(s) IV Push every 8 hours PRN Nausea and/or Vomiting  senna 2 Tablet(s) Oral at bedtime PRN Constipation        Disposition:    DVT Prophylaxis:  Subcu Heparin [  ]     LMWH [ ]     Coumadin [ ]    Xaeralto [ ]    Eliquis [ ]   Venodyne pumps [x ]    Discussed with Patient [x ]     Family [x ]          [ ]   RN[ x]      [ ]    Advance Directives:      Palliative Care:    Care Discussed with Consultants/Other Providers [x ] YES  [ ] NO

## 2019-11-16 NOTE — PROGRESS NOTE ADULT - PROBLEM SELECTOR PLAN 2
lfts a bit better today  clinically better also  cont to monitor  ? acute cholecystitis  cont ceftriaxone for now

## 2019-11-16 NOTE — PROGRESS NOTE ADULT - SUBJECTIVE AND OBJECTIVE BOX
INTERVAL HPI/OVERNIGHT EVENTS:  pt seen and examined  denies n/v  states abd pain and diarrhea improved  tolerating po    MEDICATIONS  (STANDING):  cefTRIAXone   IVPB 2000 milliGRAM(s) IV Intermittent every 24 hours  cyanocobalamin 1000 MICROGram(s) Oral daily  influenza   Vaccine 0.5 milliLiter(s) IntraMuscular once  multivitamin 1 Tablet(s) Oral daily  pantoprazole    Tablet 40 milliGRAM(s) Oral two times a day  sodium chloride 0.9%. 1000 milliLiter(s) (100 mL/Hr) IV Continuous <Continuous>  sucralfate 1 Gram(s) Oral two times a day    MEDICATIONS  (PRN):  acetaminophen   Tablet .. 650 milliGRAM(s) Oral every 6 hours PRN Temp greater or equal to 38C (100.4F), Mild Pain (1 - 3)  ondansetron Injectable 4 milliGRAM(s) IV Push every 8 hours PRN Nausea and/or Vomiting  senna 2 Tablet(s) Oral at bedtime PRN Constipation      Allergies    No Known Allergies    Intolerances        Review of Systems:    General:  No wt loss, fevers, chills, night sweats, fatigue   Eyes:  Good vision, no reported pain  ENT:  No sore throat, pain, runny nose, dysphagia  CV:  No pain, palpitations, hypo/hypertension  Resp:  No dyspnea, cough, tachypnea, wheezing  GI:  improved pain, No nausea, No vomiting, improved diarrhea, No constipation, No weight loss, No fever, No pruritis, No rectal bleeding, No melena, No dysphagia  :  No pain, bleeding, incontinence, nocturia  Muscle:  No pain, weakness  Neuro:  No weakness, tingling, memory problems  Psych:  No fatigue, insomnia, mood problems, depression  Endocrine:  No polyuria, polydypsia, cold/heat intolerance  Heme:  No petechiae, ecchymosis, easy bruisability  Skin:  No rash, tattoos, scars, edema      Vital Signs Last 24 Hrs  T(C): 36.8 (16 Nov 2019 04:37), Max: 37.1 (15 Nov 2019 12:26)  T(F): 98.2 (16 Nov 2019 04:37), Max: 98.8 (15 Nov 2019 12:26)  HR: 59 (16 Nov 2019 04:37) (59 - 59)  BP: 107/74 (16 Nov 2019 04:37) (104/67 - 107/74)  BP(mean): --  RR: 17 (16 Nov 2019 04:37) (17 - 18)  SpO2: 98% (16 Nov 2019 04:37) (98% - 99%)    PHYSICAL EXAM:    Constitutional: NAD  HEENT: ncat  Respiratory: dec bs  Cardiovascular: S1 and S2, RRR  Gastrointestinal: soft nt no guarding mild dt  Extremities: No peripheral edema  Neurological: A/O x 3        LABS:                        14.8   3.54  )-----------( x        ( 16 Nov 2019 08:00 )             41.7     11-16    140  |  106  |  8   ----------------------------<  93  4.2   |  28  |  0.81    Ca    8.7      16 Nov 2019 08:00    TPro  6.5  /  Alb  3.0<L>  /  TBili  0.5  /  DBili  x   /  AST  79<H>  /  ALT  89<H>  /  AlkPhos  54  11-16          RADIOLOGY & ADDITIONAL TESTS:

## 2019-11-16 NOTE — PROGRESS NOTE ADULT - ASSESSMENT
23 y/o man recent travel to [facility], adm w fever, labs w leukopenia and thrombocytopenia w plts keisha at 15k  stool cx sig for Enteropathogenic E Coli  LFT abnormality    -reactive leukopenia and thrombocytopenia-WBC now normal, and plts rapidly normalizing, today incr from 50's to >100k, no heme intervention needed  -LFT also improving    continue present management, will sign off for now, please call if any questions    discussed w pt

## 2019-11-16 NOTE — PROGRESS NOTE ADULT - SUBJECTIVE AND OBJECTIVE BOX
All interim records and events noted.    feeling much improved  no increased bleed/bruise      MEDICATIONS  (STANDING):  cefTRIAXone   IVPB 2000 milliGRAM(s) IV Intermittent every 24 hours  cyanocobalamin 1000 MICROGram(s) Oral daily  influenza   Vaccine 0.5 milliLiter(s) IntraMuscular once  multivitamin 1 Tablet(s) Oral daily  pantoprazole    Tablet 40 milliGRAM(s) Oral two times a day  sodium chloride 0.9%. 1000 milliLiter(s) (100 mL/Hr) IV Continuous <Continuous>  sucralfate 1 Gram(s) Oral two times a day    MEDICATIONS  (PRN):  acetaminophen   Tablet .. 650 milliGRAM(s) Oral every 6 hours PRN Temp greater or equal to 38C (100.4F), Mild Pain (1 - 3)  ondansetron Injectable 4 milliGRAM(s) IV Push every 8 hours PRN Nausea and/or Vomiting  senna 2 Tablet(s) Oral at bedtime PRN Constipation      Vital Signs Last 24 Hrs  T(C): 36.8 (16 Nov 2019 04:37), Max: 37.1 (15 Nov 2019 12:26)  T(F): 98.2 (16 Nov 2019 04:37), Max: 98.8 (15 Nov 2019 12:26)  HR: 59 (16 Nov 2019 04:37) (59 - 59)  BP: 107/74 (16 Nov 2019 04:37) (104/67 - 107/74)  BP(mean): --  RR: 17 (16 Nov 2019 04:37) (17 - 18)  SpO2: 98% (16 Nov 2019 04:37) (98% - 99%)    PHYSICAL EXAM  General: well developed  well nourished, in no acute distress  Head: atraumatic, normocephalic  ENT: sclera anicteric, buccal mucosa moist  Neck: supple, trachea midline  CV: S1 S2, regular rate and rhythm  Lungs: clear to auscultation, no wheezes/rhonchi  Abdomen: soft, nontender, bowel sounds present, no palpable masses  Extrem: no clubbing/cyanosis/edema  Skin: no significant increased ecchymosis/petechiae  Neuro: alert and oriented X3,  no focal deficits      LABS:             14.8   3.54  )-----------( 107      ( 11-16 @ 08:00 )             41.7                15.1   3.57  )-----------( 56       ( 11-15 @ 09:00 )             42.4                15.4   3.15  )-----------( 26       ( 11-14 @ 08:24 )             43.9       11-16    140  |  106  |  8   ----------------------------<  93  4.2   |  28  |  0.81    Ca    8.7      16 Nov 2019 08:00    TPro  6.5  /  Alb  3.0<L>  /  TBili  0.5  /  DBili  x   /  AST  79<H>  /  ALT  89<H>  /  AlkPhos  54  11-16 11-12 @ 20:12  PT12.2 INR1.08  PTT51.2      RADIOLOGY & ADDITIONAL STUDIES:    IMPRESSION/RECOMMENDATIONS:

## 2019-11-16 NOTE — PROGRESS NOTE ADULT - PROBLEM SELECTOR PLAN 2
suspect secondary to dengue vs viral illness. dengue serology pending  treatment as above  Hem/onc consult noted. c/w folate and B12

## 2019-11-16 NOTE — PROGRESS NOTE ADULT - ASSESSMENT
23yo M, with no significant PMH, c/o of recurring fever and abdominal pain a/w diarrhea, nausea, vomiting with streaks of blood, petechiae, myalgia, and arthralgia after returning from [facility], admitted for thrombocytopenia and possible gastroenteritis.

## 2019-11-16 NOTE — PROGRESS NOTE ADULT - SUBJECTIVE AND OBJECTIVE BOX
[facility], Division of Infectious Diseases  JESUS MANUEL Reeves A. Lee  [phone]    Name: LEATHA HICKMAN  Age: 24y  Gender: Male  MRN: [id number]    Interval History--  Notes reviewed  some ruq pain and tenderness but no vomiting,   no fever  no rash  diarrhea improved    Past Medical History--  No pertinent past medical history  No significant past surgical history      For details regarding the patient's social history, family history, and other miscellaneous elements, please refer the initial infectious diseases consultation and/or the admitting history and physical examination for this admission.    Allergies    No Known Allergies    Intolerances        Medications--  Antibiotics:  cefTRIAXone   IVPB 2000 milliGRAM(s) IV Intermittent every 24 hours    Immunologic:  influenza   Vaccine 0.5 milliLiter(s) IntraMuscular once    Other:  acetaminophen   Tablet .. PRN  cyanocobalamin  multivitamin  ondansetron Injectable PRN  pantoprazole    Tablet  senna PRN  sodium chloride 0.9%.  sucralfate      Review of Systems--  A 10-point review of systems was obtained.     Pertinent positives and negatives--  Constitutional: No fevers. No Chills. No Rigors.   Cardiovascular: No chest pain. No palpitations.  Respiratory: No shortness of breath. No cough.  Gastrointestinal: No nausea or vomiting. No diarrhea or constipation.   Psychiatric: no depression    Review of systems otherwise negative except as previously noted.    Physical Examination--  Vital Signs: T(F): 98.2 (11-16-19 @ 04:37), Max: 98.8 (11-15-19 @ 12:26)  HR: 59 (11-16-19 @ 04:37)  BP: 107/74 (11-16-19 @ 04:37)  RR: 17 (11-16-19 @ 04:37)  SpO2: 98% (11-16-19 @ 04:37)  Wt(kg): --  General: Nontoxic-appearing Male in no acute distress.  HEENT: AT/NC.anicteric. Conjunctiva pink and moist. Oropharynx clear. Dentition fair.  Neck: Not rigid. No sense of mass.  Nodes: None palpable.  Lungs: Clear bilaterally without rales, wheezing or rhonchi  Heart: Regular rate and rhythm. No Murmur.   Abdomen: Bowel sounds present and normoactive. Soft. Nondistended. Nontender.  Extremities: No cyanosis or clubbing. No edema.   Skin: Warm. Dry. Good turgor. No rash. No vasculitic stigmata.  Psychiatric: Appropriate affect and mood for situation.         Laboratory Studies--  CBC                        14.8   3.54  )-----------( 107      ( 16 Nov 2019 08:00 )             41.7       Chemistries  11-16    140  |  106  |  8   ----------------------------<  93  4.2   |  28  |  0.81    Ca    8.7      16 Nov 2019 08:00    TPro  6.5  /  Alb  3.0<L>  /  TBili  0.5  /  DBili  x   /  AST  79<H>  /  ALT  89<H>  /  AlkPhos  54  11-16      Culture Data    Culture - Stool (collected 13 Nov 2019 11:56)  Source: .Stool Feces  Final Report (16 Nov 2019 06:35):    No enteric pathogens isolated.    (Stool culture examined for Salmonella,    Shigella, Campylobacter, Aeromonas, Plesiomonas,    Vibrio, E.coli O157 and Yersinia)    GI PCR Panel, Stool (collected 13 Nov 2019 11:56)  Source: .Stool Feces  Final Report (13 Nov 2019 15:49):    Enteropathogenic E. coli (EPEC)    DETECTED by PCR    *******Please Note:*******    GI panel PCR evaluates for:    Campylobacter, Plesiomonas shigelloides, Salmonella,    Vibrio, Yersinia enterocolitica, Enteroaggregative    Escherichia coli (EAEC), Enteropathogenic E.coli (EPEC),    Enterotoxigenic E. coli (ETEC) lt/st, Shiga-like    toxin-producing E. coli (STEC) stx1/stx2,    Shigella/ Enteroinvasive E. coli (EIEC), Cryptosporidium,    Cyclospora cayetanensis, Entamoeba histolytica,    Giardia lamblia, AdenovirusF 40/41, Astrovirus,    Norovirus GI/GII, Rotavirus A, Sapovirus

## 2019-11-16 NOTE — PROGRESS NOTE ADULT - PROBLEM SELECTOR PLAN 2
multifactorial; now improving  HIDA scan negative  surgical eval noted, no intervention at present  unclear as to why patient developed ascites, but insufficient fluid for tap  trend lfts, likely in setting of infection, now improved  cont ppi bid/carafate bid  prn pain control/monitor exam   if pain recurs will consider inpt egd

## 2019-11-16 NOTE — PROGRESS NOTE ADULT - PROBLEM SELECTOR PLAN 1
improved, 2/2 epec  abx per id, f/u recs, further w/u in progress  diet as tolerated  monitor gi function

## 2019-11-16 NOTE — PROGRESS NOTE ADULT - ASSESSMENT
Abdominal pain still present but improving  Vitals non-suggestive.  Patient appears well.  Abdomen with localizing tenderness but no peritoneal findings.  Labs stable overall and tolerance of diet encouraging.  No plans for surgical intervention at this time.  Awaiting further data from GI and ID evaluations.

## 2019-11-17 ENCOUNTER — TRANSCRIPTION ENCOUNTER (OUTPATIENT)
Age: 24
End: 2019-11-17

## 2019-11-17 VITALS
DIASTOLIC BLOOD PRESSURE: 57 MMHG | TEMPERATURE: 98 F | OXYGEN SATURATION: 99 % | RESPIRATION RATE: 18 BRPM | SYSTOLIC BLOOD PRESSURE: 108 MMHG | HEART RATE: 63 BPM

## 2019-11-17 LAB
ALBUMIN SERPL ELPH-MCNC: 3.2 G/DL — LOW (ref 3.3–5)
ALP SERPL-CCNC: 64 U/L — SIGNIFICANT CHANGE UP (ref 40–120)
ALT FLD-CCNC: 120 U/L — HIGH (ref 12–78)
ANION GAP SERPL CALC-SCNC: 5 MMOL/L — SIGNIFICANT CHANGE UP (ref 5–17)
AST SERPL-CCNC: 98 U/L — HIGH (ref 15–37)
BILIRUB SERPL-MCNC: 0.3 MG/DL — SIGNIFICANT CHANGE UP (ref 0.2–1.2)
BUN SERPL-MCNC: 16 MG/DL — SIGNIFICANT CHANGE UP (ref 7–23)
CALCIUM SERPL-MCNC: 8.7 MG/DL — SIGNIFICANT CHANGE UP (ref 8.5–10.1)
CHLORIDE SERPL-SCNC: 105 MMOL/L — SIGNIFICANT CHANGE UP (ref 96–108)
CO2 SERPL-SCNC: 31 MMOL/L — SIGNIFICANT CHANGE UP (ref 22–31)
CREAT SERPL-MCNC: 0.98 MG/DL — SIGNIFICANT CHANGE UP (ref 0.5–1.3)
GLUCOSE SERPL-MCNC: 93 MG/DL — SIGNIFICANT CHANGE UP (ref 70–99)
HCT VFR BLD CALC: 42.6 % — SIGNIFICANT CHANGE UP (ref 39–50)
HGB BLD-MCNC: 14.9 G/DL — SIGNIFICANT CHANGE UP (ref 13–17)
MCHC RBC-ENTMCNC: 30.5 PG — SIGNIFICANT CHANGE UP (ref 27–34)
MCHC RBC-ENTMCNC: 35 GM/DL — SIGNIFICANT CHANGE UP (ref 32–36)
MCV RBC AUTO: 87.3 FL — SIGNIFICANT CHANGE UP (ref 80–100)
NRBC # BLD: 0 /100 WBCS — SIGNIFICANT CHANGE UP (ref 0–0)
PLATELET # BLD AUTO: 175 K/UL — SIGNIFICANT CHANGE UP (ref 150–400)
POTASSIUM SERPL-MCNC: 4.2 MMOL/L — SIGNIFICANT CHANGE UP (ref 3.5–5.3)
POTASSIUM SERPL-SCNC: 4.2 MMOL/L — SIGNIFICANT CHANGE UP (ref 3.5–5.3)
PROT SERPL-MCNC: 6.8 G/DL — SIGNIFICANT CHANGE UP (ref 6–8.3)
RBC # BLD: 4.88 M/UL — SIGNIFICANT CHANGE UP (ref 4.2–5.8)
RBC # FLD: 11.5 % — SIGNIFICANT CHANGE UP (ref 10.3–14.5)
SODIUM SERPL-SCNC: 141 MMOL/L — SIGNIFICANT CHANGE UP (ref 135–145)
WBC # BLD: 4.62 K/UL — SIGNIFICANT CHANGE UP (ref 3.8–10.5)
WBC # FLD AUTO: 4.62 K/UL — SIGNIFICANT CHANGE UP (ref 3.8–10.5)

## 2019-11-17 PROCEDURE — 99231 SBSQ HOSP IP/OBS SF/LOW 25: CPT

## 2019-11-17 PROCEDURE — 82747 ASSAY OF FOLIC ACID RBC: CPT

## 2019-11-17 PROCEDURE — 86850 RBC ANTIBODY SCREEN: CPT

## 2019-11-17 PROCEDURE — 74177 CT ABD & PELVIS W/CONTRAST: CPT

## 2019-11-17 PROCEDURE — 80307 DRUG TEST PRSMV CHEM ANLYZR: CPT

## 2019-11-17 PROCEDURE — 78226 HEPATOBILIARY SYSTEM IMAGING: CPT

## 2019-11-17 PROCEDURE — 80053 COMPREHEN METABOLIC PANEL: CPT

## 2019-11-17 PROCEDURE — 76705 ECHO EXAM OF ABDOMEN: CPT

## 2019-11-17 PROCEDURE — 86790 VIRUS ANTIBODY NOS: CPT

## 2019-11-17 PROCEDURE — 36415 COLL VENOUS BLD VENIPUNCTURE: CPT

## 2019-11-17 PROCEDURE — 86709 HEPATITIS A IGM ANTIBODY: CPT

## 2019-11-17 PROCEDURE — 83605 ASSAY OF LACTIC ACID: CPT

## 2019-11-17 PROCEDURE — 83690 ASSAY OF LIPASE: CPT

## 2019-11-17 PROCEDURE — 86901 BLOOD TYPING SEROLOGIC RH(D): CPT

## 2019-11-17 PROCEDURE — 85610 PROTHROMBIN TIME: CPT

## 2019-11-17 PROCEDURE — A9537: CPT

## 2019-11-17 PROCEDURE — 93005 ELECTROCARDIOGRAM TRACING: CPT

## 2019-11-17 PROCEDURE — 70450 CT HEAD/BRAIN W/O DYE: CPT

## 2019-11-17 PROCEDURE — 87045 FECES CULTURE AEROBIC BACT: CPT

## 2019-11-17 PROCEDURE — 86708 HEPATITIS A ANTIBODY: CPT

## 2019-11-17 PROCEDURE — 80048 BASIC METABOLIC PNL TOTAL CA: CPT

## 2019-11-17 PROCEDURE — 96374 THER/PROPH/DIAG INJ IV PUSH: CPT

## 2019-11-17 PROCEDURE — 85730 THROMBOPLASTIN TIME PARTIAL: CPT

## 2019-11-17 PROCEDURE — 85027 COMPLETE CBC AUTOMATED: CPT

## 2019-11-17 PROCEDURE — 87493 C DIFF AMPLIFIED PROBE: CPT

## 2019-11-17 PROCEDURE — 82607 VITAMIN B-12: CPT

## 2019-11-17 PROCEDURE — 80074 ACUTE HEPATITIS PANEL: CPT

## 2019-11-17 PROCEDURE — 85045 AUTOMATED RETICULOCYTE COUNT: CPT

## 2019-11-17 PROCEDURE — 87177 OVA AND PARASITES SMEARS: CPT

## 2019-11-17 PROCEDURE — 99285 EMERGENCY DEPT VISIT HI MDM: CPT | Mod: 25

## 2019-11-17 PROCEDURE — 99239 HOSP IP/OBS DSCHRG MGMT >30: CPT

## 2019-11-17 PROCEDURE — 86900 BLOOD TYPING SEROLOGIC ABO: CPT

## 2019-11-17 PROCEDURE — 87046 STOOL CULTR AEROBIC BACT EA: CPT

## 2019-11-17 PROCEDURE — 87507 IADNA-DNA/RNA PROBE TQ 12-25: CPT

## 2019-11-17 PROCEDURE — 81001 URINALYSIS AUTO W/SCOPE: CPT

## 2019-11-17 PROCEDURE — 86720 LEPTOSPIRA ANTIBODY: CPT

## 2019-11-17 PROCEDURE — 82746 ASSAY OF FOLIC ACID SERUM: CPT

## 2019-11-17 PROCEDURE — 96375 TX/PRO/DX INJ NEW DRUG ADDON: CPT

## 2019-11-17 RX ORDER — SUCRALFATE 1 G
1 TABLET ORAL
Qty: 30 | Refills: 0
Start: 2019-11-17 | End: 2019-12-01

## 2019-11-17 RX ORDER — PANTOPRAZOLE SODIUM 20 MG/1
1 TABLET, DELAYED RELEASE ORAL
Qty: 30 | Refills: 0
Start: 2019-11-17 | End: 2019-12-16

## 2019-11-17 RX ORDER — PREGABALIN 225 MG/1
1 CAPSULE ORAL
Qty: 0 | Refills: 0 | DISCHARGE
Start: 2019-11-17

## 2019-11-17 RX ADMIN — PREGABALIN 1000 MICROGRAM(S): 225 CAPSULE ORAL at 11:23

## 2019-11-17 RX ADMIN — Medication 1 TABLET(S): at 11:23

## 2019-11-17 RX ADMIN — Medication 1 GRAM(S): at 06:20

## 2019-11-17 RX ADMIN — PANTOPRAZOLE SODIUM 40 MILLIGRAM(S): 20 TABLET, DELAYED RELEASE ORAL at 06:20

## 2019-11-17 NOTE — DISCHARGE NOTE PROVIDER - NSDCMRMEDTOKEN_GEN_ALL_CORE_FT
cyanocobalamin 1000 mcg oral tablet: 1 tab(s) orally once a day  Multiple Vitamins oral tablet: 1 tab(s) orally once a day  pantoprazole 40 mg oral delayed release tablet: 1 tab(s) orally once a day   sucralfate 1 g oral tablet: 1 tab(s) orally 2 times a day

## 2019-11-17 NOTE — PROGRESS NOTE ADULT - PROBLEM SELECTOR PLAN 3
resolved  cont ppi/carafate; anti emetics prn
resolved  cont ppi/carafate; anti emetics prn
resolved  f/u am labs  cont ppi ppx  anti emetics prn
resolved  f/u am labs  cont ppi ppx  anti emetics prn
- Pt with nausea/vomiting: now resolved  - Diarrhea 2/2 travelers diarrhea. C-diff neg. + EEcoli On empiric antibx  - c/w IV protonix,  maintain adequate IV hydration  -  start on reg diet  - Monitor fever curve, Tylenol 650mg q6h prn for fever  -  Gi consult noted
- Pt with nausea/vomiting: now resolved  - Diarrhea 2/2 travelers diarrhea. C-diff neg. On empiric antibx  - c/w IV protonix,  maintain adequate IV hydration  -  start on reg diet  - Monitor fever curve, Tylenol 650mg q6h prn for fever  -  Gi consult noted
improving  EPEC in stool

## 2019-11-17 NOTE — PROGRESS NOTE ADULT - PROBLEM SELECTOR PROBLEM 4
Thrombocytopenia
Gallbladder disease

## 2019-11-17 NOTE — PROGRESS NOTE ADULT - PROBLEM SELECTOR PROBLEM 3
Coffee ground emesis
Diarrhea
Gastroenteritis

## 2019-11-17 NOTE — PROGRESS NOTE ADULT - SUBJECTIVE AND OBJECTIVE BOX
INTERVAL HPI/OVERNIGHT EVENTS:  pt seen and examined  denies n/v/d  states abd pain better  tolerating po    MEDICATIONS  (STANDING):  cefTRIAXone   IVPB 2000 milliGRAM(s) IV Intermittent every 24 hours  cyanocobalamin 1000 MICROGram(s) Oral daily  influenza   Vaccine 0.5 milliLiter(s) IntraMuscular once  multivitamin 1 Tablet(s) Oral daily  pantoprazole    Tablet 40 milliGRAM(s) Oral two times a day  sucralfate 1 Gram(s) Oral two times a day    MEDICATIONS  (PRN):  acetaminophen   Tablet .. 650 milliGRAM(s) Oral every 6 hours PRN Temp greater or equal to 38C (100.4F), Mild Pain (1 - 3)  ondansetron Injectable 4 milliGRAM(s) IV Push every 8 hours PRN Nausea and/or Vomiting  senna 2 Tablet(s) Oral at bedtime PRN Constipation      Allergies    No Known Allergies    Intolerances        Review of Systems:    General:  No wt loss, fevers, chills, night sweats, fatigue   Eyes:  Good vision, no reported pain  ENT:  No sore throat, pain, runny nose, dysphagia  CV:  No pain, palpitations, hypo/hypertension  Resp:  No dyspnea, cough, tachypnea, wheezing  GI:  improving pain, No nausea, No vomiting, No diarrhea, No constipation, No weight loss, No fever, No pruritis, No rectal bleeding, No melena, No dysphagia  :  No pain, bleeding, incontinence, nocturia  Muscle:  No pain, weakness  Neuro:  No weakness, tingling, memory problems  Psych:  No fatigue, insomnia, mood problems, depression  Endocrine:  No polyuria, polydypsia, cold/heat intolerance  Heme:  No petechiae, ecchymosis, easy bruisability  Skin:  No rash, tattoos, scars, edema      Vital Signs Last 24 Hrs  T(C): 36.9 (17 Nov 2019 06:17), Max: 37.2 (16 Nov 2019 13:39)  T(F): 98.4 (17 Nov 2019 06:17), Max: 98.9 (16 Nov 2019 13:39)  HR: 61 (17 Nov 2019 06:17) (61 - 73)  BP: 97/62 (17 Nov 2019 06:17) (97/62 - 116/74)  BP(mean): --  RR: 18 (17 Nov 2019 06:17) (17 - 18)  SpO2: 98% (17 Nov 2019 06:17) (98% - 98%)    PHYSICAL EXAM:  Constitutional: NAD  HEENT: ncat  Respiratory: dec bs  Cardiovascular: S1 and S2, RRR  Gastrointestinal: soft mild ruq ttp no guarding mild dt  Extremities: No peripheral edema  Neurological: A/O x 3        LABS:                        14.9   4.62  )-----------( 175      ( 17 Nov 2019 06:23 )             42.6     11-17    141  |  105  |  16  ----------------------------<  93  4.2   |  31  |  0.98    Ca    8.7      17 Nov 2019 06:23    TPro  6.8  /  Alb  3.2<L>  /  TBili  0.3  /  DBili  x   /  AST  98<H>  /  ALT  120<H>  /  AlkPhos  64  11-17          RADIOLOGY & ADDITIONAL TESTS:

## 2019-11-17 NOTE — PROGRESS NOTE ADULT - PROBLEM SELECTOR PROBLEM 1
Diarrhea
R/O Dengue
Thrombocytopenia

## 2019-11-17 NOTE — PROGRESS NOTE ADULT - SUBJECTIVE AND OBJECTIVE BOX
Pt seen and examined.  Abdominal discomfort significantly improved.  Denies nausea or vomiting.  Tolerating diet.    Vital Signs Last 24 Hrs  T(C): 36.9 (17 Nov 2019 06:17), Max: 37.2 (16 Nov 2019 13:39)  T(F): 98.4 (17 Nov 2019 06:17), Max: 98.9 (16 Nov 2019 13:39)  HR: 61 (17 Nov 2019 06:17) (61 - 73)  BP: 97/62 (17 Nov 2019 06:17) (97/62 - 116/74)  BP(mean): --  RR: 18 (17 Nov 2019 06:17) (17 - 18)  SpO2: 98% (17 Nov 2019 06:17) (98% - 98%)    11-16 @ 07:01  -  11-17 @ 07:00  --------------------------------------------------------  IN: 200 mL / OUT: 0 mL / NET: 200 mL    MEDICATIONS  (STANDING):  cefTRIAXone   IVPB 2000 milliGRAM(s) IV Intermittent every 24 hours  cyanocobalamin 1000 MICROGram(s) Oral daily  influenza   Vaccine 0.5 milliLiter(s) IntraMuscular once  multivitamin 1 Tablet(s) Oral daily  pantoprazole    Tablet 40 milliGRAM(s) Oral two times a day  sucralfate 1 Gram(s) Oral two times a day    MEDICATIONS  (PRN):  acetaminophen   Tablet .. 650 milliGRAM(s) Oral every 6 hours PRN Temp greater or equal to 38C (100.4F), Mild Pain (1 - 3)  ondansetron Injectable 4 milliGRAM(s) IV Push every 8 hours PRN Nausea and/or Vomiting  senna 2 Tablet(s) Oral at bedtime PRN Constipation                            14.9   4.62  )-----------( 175      ( 17 Nov 2019 06:23 )             42.6                         14.8   3.54  )-----------( 107      ( 16 Nov 2019 08:00 )             41.7                         15.1   3.57  )-----------( 56       ( 15 Nov 2019 09:00 )             42.4       11-17    141  |  105  |  16  ----------------------------<  93  4.2   |  31  |  0.98    Ca    8.7      17 Nov 2019 06:23    TPro  6.8  /  Alb  3.2<L>  /  TBili  0.3  /  DBili  x   /  AST  98<H>  /  ALT  120<H>  /  AlkPhos  64  11-17      Physical Exam:  Awake alert and oriented x3 in no acute distress. NCAT, PERRLA, EOMI  Lungs clear bilaterally without wheezes rhonchi or rales.  Regular rate and rhythm  Abdomen soft, nontender, nondistended, positive bowel sounds in all 4 quadrants.   Extremities without edema or tenderness.

## 2019-11-17 NOTE — DISCHARGE NOTE PROVIDER - NSDCCPCAREPLAN_GEN_ALL_CORE_FT
PRINCIPAL DISCHARGE DIAGNOSIS  Diagnosis: Pancytopenia  Assessment and Plan of Treatment: Improved and follow with ID in 1 week      SECONDARY DISCHARGE DIAGNOSES  Diagnosis: Gallbladder problem  Assessment and Plan of Treatment: Abd pain resolved. follow with GI    Diagnosis: Transaminitis  Assessment and Plan of Treatment: Transaminitis: down trending. follow with GI    Diagnosis: Gastroenteritis  Assessment and Plan of Treatment: Diarrhea resolved. follow with GI

## 2019-11-17 NOTE — DISCHARGE NOTE PROVIDER - HOSPITAL COURSE
FROM ADMISSION H+P:     HPI:    25yo M, with no significant PMh, c/o 5 day duration of fever and abdominal pain that developed yesterday. Pt states he returned from [facility] on 11/2 where he spent nine days. During this time he was bitten by mosquitoes multiple times. He traveled with family, and none of his travel companions are complaining of similar symptoms. Per pt, fevers started on Friday 11/8. Tmax at home 103-104. The following day he visited urgent care who advised alternating 2 tabs of double-strength Tylenol with 3 tabs of 200mg Motrin every few hours. Fever would respond to therapy but always recurred. Yesterday, pt developed abdominal pain mostly in the right upper quadrant that is associated with nausea, vomiting, and nonbloody diarrhea. Today, pt noted streaks of blood in his vomit and has been unable to keep solids and fluids down. Pt states he received pre-travel prophylactic care including MMRV and TB at an urgent care but can't remember if he received anything else. Endorses b/l knee pain, calf pain, scattered rash, but denies chest pain, palpitations, SOB, cough, or urinary symptoms.        In the ED    VS T 99.7 now 100.4 /68 now 103/69 HR 84 RR 14 SpO2 96%    Significant labs include: WBC 2.79, Platelets 13, Na 134, .    CT head: no evidence of acute intracranial hemorrhage    CT a/p: severe gallbladder thickening and mild-mod abdominal and pelvic ascites    US gallbladder: nonspecific edematous gallbladder with pericholecystic fluid        Received 2L NS, Protonix, Pepcid, Zofran, 1x Zosyn, Tylenol 650mg. (12 Nov 2019 20:45)            ---    HOSPITAL COURSE: admitted for thrombocytopenia and possible gastroenteritis. suspect  secondary to dengue vs some viral illness. hem/onc, and ID Consult requested. treated with     folate and B12 and IVF. Diarrhea 2/2 travelers diarrhea. C-diff neg. stool + for Enteropathic Ecoli treated with empiric antibx. Pt with RUQ pain, and ascites. Severe gallbladder thickening and pericholecystic fluid noted on US and CT, though may be reactive to liver disease. Transaminitis; suspect 2/2 dengue/ GB ds. HIDA neg. followed by surgery. dengue serology pending. Thrombocytopenia and leucopenia improved rapidly. Fever and abd resolved. stable for d/c and will follow with ID in 1 week.    ---    CONSULTANTS:         ---    TIME SPENT:    The total amount of time spent reviewing the hospital notes, laboratory values, imaging findings, assessing/counseling the patient, discussing with consultant physicians, social work, nursing staff took 42 minutes        ---    FINAL DISCHARGE DIAGNOSIS LIST:    Please see last daily progress note for final discharge diagnoses

## 2019-11-17 NOTE — DISCHARGE NOTE PROVIDER - CARE PROVIDER_API CALL
Dalia Pierce)  Infectious Disease; Internal Medicine  [address] [facility], [address]  [place] [address]  Phone: [phone]  Fax: [phone]  Follow Up Time:     Donell ePna ()  Gastroenterology; Internal Medicine  [address] [place] [address]  Phone: [phone]  Fax: [phone]  Follow Up Time:

## 2019-11-17 NOTE — DISCHARGE NOTE NURSING/CASE MANAGEMENT/SOCIAL WORK - PATIENT PORTAL LINK FT
You can access the FollowMyHealth Patient Portal offered by [facility] by registering at the following website: [email or web address]. By joining [employer]’s FollowMyHealth portal, you will also be able to view your health information using other applications (apps) compatible with our system.

## 2019-11-17 NOTE — DISCHARGE NOTE NURSING/CASE MANAGEMENT/SOCIAL WORK - NSTOBACCOREFERRAL_GEN_A_CS
pt stated" I do not want smoking cessation education or referral at this time"/Patient declined information

## 2019-11-17 NOTE — PROGRESS NOTE ADULT - ASSESSMENT
Symptoms resolving.  WBC and PLT counts improved.  Stool cultures only showing Enteropathogenic Ecoli.    As per ID picture favors Dengue.  Continue supportive care.  Advance diet as tolerated.  No acute surgical interventions indicated.   Interval follow up and imaging of GB in 4-6 weeks to reassess GB edema as HIDA was negative.  Discharge as per primary service.

## 2019-11-17 NOTE — PROGRESS NOTE ADULT - PROBLEM SELECTOR PLAN 4
am labs pending  heme/onc following  monitor for s/s bleeding
am labs pending  heme/onc following  monitor for s/s bleeding
improving   heme/onc following  monitor for s/s bleeding
resolved
- Pt with RUQ pain, and ascites. for possible paracentesis with IR once PLT are improved  - Severe gallbladder thickening and pericholecystic fluid noted on US and CT though may be reactive to liver disease  - Transaminitis; suspect 2/2 dengue/ GB ds.   - HIDA neg  - surgery consult  appreciated  - AST elevated, monitor daily liver function
- Pt with RUQ pain, and ascites. for possible paracentesis with IR once PLT are improved  - Severe gallbladder thickening and pericholecystic fluid noted on US and CT though may be reactive to liver disease  - Transaminitis; suspect 2/2 dengue/ GB ds.   - HIDA neg  - surgery consult  appreciated  - AST elevated, monitor daily liver function
- Pt with RUQ pain, and ascites. for possible paracentesis with IR once PLT are improved  - Severe gallbladder thickening and pericholecystic fluid noted on US and CT though may be reactive to liver disease  - Transaminitis; suspect 2/2 dengue/ GB ds. improving  - HIDA neg  - surgery consult  appreciated  - AST elevated, monitor daily liver function
- Pt with RUQ pain, and ascites. for possible paracentesis with IR once PLT are improved  - Severe gallbladder thickening and pericholecystic fluid noted on US and CT though may be reactive to liver disease  -HIDA neg  - surgery consult pending   - AST elevated, monitor daily liver function

## 2019-11-17 NOTE — PROGRESS NOTE ADULT - REASON FOR ADMISSION
fevers
"fevers"
fevers

## 2019-11-17 NOTE — PROGRESS NOTE ADULT - ATTENDING COMMENTS
Call if ID input needed over the weekend, Dr. Dalia Pierce is on call.    Tripp Peterson MD  [phone]
Case discussed with PA.    Pt seen and examined by me independently.  Abdominal pain resolving.  Still with loose stools.  Stool cultures positive for Enteropathogenic E coli  WBC and PLT count seem to be improving slightly but remain quite low.  Transaminase increased but TB remains normal.  Tolerating diet.  If febrile or clinical picture worsens may benefit from percutaneous Cholecystostomy.  For now, Continue Antibiotics for Edematous GB as HIDA scan was negative for acute cholecystitis.
Reviewed with patient.  Reviewed with nursing.    Tripp Peterson MD  [phone]
Advanced care planning was discussed with patient and family.  Advanced care planning forms were reviewed and discussed.  Risks, benefits and alternatives of gastroenterologic procedures were discussed in detail and all questions were answered.    30 minutes spent.

## 2019-11-17 NOTE — PROGRESS NOTE ADULT - PROBLEM SELECTOR PLAN 1
resolved; 2/2 epec  abx per id, f/u recs, further w/u in progress  diet as tolerated  monitor gi function resolved; 2/2 epec  abx per id  diet as tolerated  monitor gi function

## 2019-11-18 LAB
DENV IGG+IGM PNL SER IA: >15 ISR — HIGH
DENV IGM SER-ACNC: 4.86 ISR — HIGH
LEPTOSPIRA AB TITR SER: NEGATIVE — SIGNIFICANT CHANGE UP

## 2019-11-19 LAB — HEV IGM SER QL: SIGNIFICANT CHANGE UP

## 2019-11-22 LAB
CULTURE RESULTS: SIGNIFICANT CHANGE UP
SPECIMEN SOURCE: SIGNIFICANT CHANGE UP

## 2020-08-13 NOTE — PROGRESS NOTE ADULT - PROBLEM SELECTOR PLAN 2
Urine sample collected and sent to lab.     multifactorial; improving  HIDA scan negative; surgical eval noted, no intervention at present  unclear as to why patient developed ascites, but insufficient fluid for tap  trend lfts, likely in setting of infection, overall better but up a bit today, monitor for need of repeat imaging  cont ppi bid/carafate bid  prn pain control/monitor exam   pt defers inpt egd as abd pain overall better

## 2020-08-21 ENCOUNTER — TRANSCRIPTION ENCOUNTER (OUTPATIENT)
Age: 25
End: 2020-08-21

## 2021-01-20 PROBLEM — Z78.9 OTHER SPECIFIED HEALTH STATUS: Chronic | Status: ACTIVE | Noted: 2019-11-12

## 2021-01-25 ENCOUNTER — APPOINTMENT (OUTPATIENT)
Dept: ORTHOPEDIC SURGERY | Facility: CLINIC | Age: 26
End: 2021-01-25
Payer: COMMERCIAL

## 2021-01-25 VITALS — RESPIRATION RATE: 16 BRPM | BODY MASS INDEX: 24.5 KG/M2 | HEIGHT: 71 IN | WEIGHT: 175 LBS

## 2021-01-25 DIAGNOSIS — Z78.9 OTHER SPECIFIED HEALTH STATUS: ICD-10-CM

## 2021-01-25 DIAGNOSIS — S69.81XA OTHER SPECIFIED INJURIES OF RIGHT WRIST, HAND AND FINGER(S), INITIAL ENCOUNTER: ICD-10-CM

## 2021-01-25 PROBLEM — Z00.00 ENCOUNTER FOR PREVENTIVE HEALTH EXAMINATION: Status: ACTIVE | Noted: 2021-01-25

## 2021-01-25 PROCEDURE — 99072 ADDL SUPL MATRL&STAF TM PHE: CPT

## 2021-01-25 PROCEDURE — 99203 OFFICE O/P NEW LOW 30 MIN: CPT

## 2021-01-25 PROCEDURE — 73140 X-RAY EXAM OF FINGER(S): CPT | Mod: F9

## 2021-04-07 ENCOUNTER — TRANSCRIPTION ENCOUNTER (OUTPATIENT)
Age: 26
End: 2021-04-07

## 2022-09-08 ENCOUNTER — EMERGENCY (EMERGENCY)
Facility: HOSPITAL | Age: 27
LOS: 1 days | Discharge: HOME | End: 2022-09-08
Attending: EMERGENCY MEDICINE
Payer: COMMERCIAL

## 2022-09-08 VITALS
SYSTOLIC BLOOD PRESSURE: 108 MMHG | TEMPERATURE: 98 F | DIASTOLIC BLOOD PRESSURE: 67 MMHG | RESPIRATION RATE: 20 BRPM | HEIGHT: 70 IN | HEART RATE: 78 BPM | WEIGHT: 173.94 LBS | OXYGEN SATURATION: 96 %

## 2022-09-08 PROCEDURE — 99283 EMERGENCY DEPT VISIT LOW MDM: CPT

## 2022-09-08 PROCEDURE — 73140 X-RAY EXAM OF FINGER(S): CPT | Mod: 26,RT

## 2022-09-08 PROCEDURE — 73140 X-RAY EXAM OF FINGER(S): CPT

## 2022-09-08 PROCEDURE — 99283 EMERGENCY DEPT VISIT LOW MDM: CPT | Mod: 25

## 2022-09-08 NOTE — ED PROVIDER NOTE - MUSCULOSKELETAL, MLM
Right hand and fifth finger:  tenderness to 5th finger (distal phalanx), and mallet deformity of 5th finger; range of motion is limited to DIP but not to PIP when isolated, capillary refill < 2 sec

## 2022-09-08 NOTE — ED PROVIDER NOTE - PATIENT PORTAL LINK FT
You can access the FollowMyHealth Patient Portal offered by [facility] by registering at the following website: [email or web address]. By joining [employer]’s FollowMyHealth portal, you will also be able to view your health information using other applications (apps) compatible with our system.

## 2022-09-08 NOTE — ED PROVIDER NOTE - NSFOLLOWUPINSTRUCTIONS_ED_ALL_ED_FT
Mallet Finger    A hand's anatomy showing extensor tendons.   Mallet finger is an injury that occurs when an object hits the tip of your straightened finger or thumb. It is also known as baseball finger. The blow to your fingertip causes it to bend more than normal, which tears the cord that attaches to the tip of your finger (extensor tendon).     Your extensor tendon is what straightens the end of your finger. If this tendon is damaged, you will not be able to straighten your fingertip. Sometimes, a piece of bone may be pulled away with the tendon (avulsion injury), or the tendon may tear completely. In some cases, surgery may be required to repair the damage.      What are the causes?    Mallet finger is caused by a hard, direct hit to the tip of your finger or thumb. This injury often happens from getting hit in the finger with a hard ball, such as a baseball.      What increases the risk?    This injury is more likely to happen if you play a sport that uses a hard ball.      What are the signs or symptoms?    The main symptom of this injury is the inability to straighten the tip of your finger. You can manually straighten your fingertip with your other hand, but the finger cannot straighten on its own.    Other symptoms may include:  •Pain.      •Swelling.      •Bruising.      •Blood under the fingernail.        How is this diagnosed?    Your health care provider may suspect mallet finger if you are not able to extend your fingertip, especially if you recently injured your hand. Your health care provider will do a physical exam. This may include X-rays to see if a piece of bone has been pulled away or if the finger joint has  (dislocated).      How is this treated?    Mallet finger may be treated with:  •A splint on your fingertip to keep it straight (extended) while the tendon heals.    •Surgery to repair the tendon. This is done in severe cases. This may involve:  •Using a pin or screw to keep your finger extended and your tendon attached.      •Using a piece of tendon from another part of your body (graft) to replace a torn tendon.          Follow these instructions at home:    If you have a removable splint:     •Wear the splint as told by your health care provider. Remove it only as told by your health care provider.      •Check the skin around the splint every day. Tell your health care provider about any concerns.      •Loosen the splint if your fingers tingle, become numb, or turn cold and blue.      •Keep the splint clean.    •If the splint is not waterproof:  •Do not let it get wet.      •Cover it with a watertight covering when you take a bath or a shower.      •If you remove your splint to dry it or change it:  •Gently press your finger on a flat surface to keep it straight. Failing to do so may lead to a permanent injury or force you to wear the splint for a longer period of time.      •Check the skin under the splint. Tell your health care provider if you notice a blister or red and raw skin.          Managing pain, stiffness, and swelling   Bag of ice on a towel on the skin. •If directed, put ice on the injured area. To do this:  •If you have a removable splint, remove it as told by your health care provider.      •Put ice in a plastic bag.      •Place a towel between your skin and the bag.      •Leave the ice on for 20 minutes, 2–3 times a day.      •Remove the ice if your skin turns bright red. This is very important. If you cannot feel pain, heat, or cold, you have a greater risk of damage to the area.        •Move your fingers often to reduce stiffness and swelling.      •Raise (elevate)the injured area above the level of your heart while you are sitting or lying down.      General instructions     •Take over-the-counter and prescription medicines only as told by your health care provider.      •Ask your health care provider if the medicine prescribed to you requires you to avoid driving or using machinery.       •Keep all follow-up visits. This is important.        Contact a health care provider if:    •You have pain or swelling that is getting worse.      •Your finger feels cold.      •You cannot extend your finger after treatment.      •You notice that the skin under the splint is red, raw, or has a blister.        Get help right away if:  •Even after loosening your splint, your finger is:  •Very red and swollen.      •White or blue.      •Numb or tingling.          Summary    •Mallet finger is an injury that occurs from a hard, direct hit to the tip of your finger or thumb.      •The blow to your fingertip causes it to bend more than normal, tearing the tendon that straightens the end of your finger. You cannot straighten your fingertip if this tendon is torn.      •This injury often happens from getting hit in the finger with a hard ball, such as a baseball.      •Treatment will depend on how severe the injury is. You may need to wear a splint to keep the finger straight while it heals. A more severe injury may require surgery to repair the tendon.      This information is not intended to replace advice given to you by your health care provider. Make sure you discuss any questions you have with your health care provider.      Document Revised: 11/10/2021 Document Reviewed: 11/10/2021    Elsevier Patient Education © 2022 [employer] Inc[employer]

## 2022-09-08 NOTE — ED PROVIDER NOTE - OBJECTIVE STATEMENT
26 y/o man, no PMH, c/o injury to right 5th finger at work today; finger was accidentally jammed against heavy object and pain/swelling and deformity to distal phalanx mainly.  No other injury.

## 2022-09-08 NOTE — ED PROVIDER NOTE - CARE PROVIDER_API CALL
Dylan Dove)  Plastic Surgery  [address] [place] [address], [address] [place] [id number]  Phone: [phone]  Fax: [phone]  Follow Up Time: 4-6 Days

## 2022-09-08 NOTE — ED PROVIDER NOTE - CLINICAL SUMMARY MEDICAL DECISION MAKING FREE TEXT BOX
28 y/o man, no PMH, c/o injury to right 5th finger at work today; finger was accidentally jammed against heavy object and pain/swelling and deformity to distal phalanx mainly--exam c/w mallet deformity and X-ray shows no other abnormality; splint placed in extension at the DIP joint, referred to hand specialist and NSAID's for pain.

## 2022-09-08 NOTE — ED ADULT NURSE NOTE - CAS EDN DISCHARGE ASSESSMENT
----- Message from Britany Mas LPN sent at 9/21/2667  3:06 PM EDT -----  Regarding: FW: Dr Cicero Dandy: [phone]  Nurse called mom, mom confirmed patients last name and date of birth. Mom is going out of town tomorrow if you could inform her of these results today. Thank you.   ----- Message -----  From: Robert Sanchez  Sent: 3/28/2019   2:53 PM  To: [facility]  Subject: Dr Alex Cooper is calling to get the MRI results. Please advise.     [phone]    Please call mother and tell her that the child's MRI scan of the brain was normal.
Nurse called mother to give results, nurse had to leave message for mother to call office back.
Alert and oriented to person, place and time

## 2022-09-12 ENCOUNTER — APPOINTMENT (OUTPATIENT)
Dept: ORTHOPEDIC SURGERY | Facility: CLINIC | Age: 27
End: 2022-09-12

## 2022-09-12 VITALS — RESPIRATION RATE: 16 BRPM | BODY MASS INDEX: 24.91 KG/M2 | HEIGHT: 70 IN | WEIGHT: 174 LBS

## 2022-09-12 DIAGNOSIS — Z87.39 PERSONAL HISTORY OF OTHER DISEASES OF THE MUSCULOSKELETAL SYSTEM AND CONNECTIVE TISSUE: ICD-10-CM

## 2022-09-12 PROCEDURE — 99213 OFFICE O/P EST LOW 20 MIN: CPT

## 2022-09-12 PROCEDURE — 73140 X-RAY EXAM OF FINGER(S): CPT | Mod: F9

## 2022-09-12 PROCEDURE — 99072 ADDL SUPL MATRL&STAF TM PHE: CPT

## 2022-09-12 NOTE — PHYSICAL EXAM
[de-identified] : There is swelling and tenderness localized over the DIP joint of the right fourth digit with a 85 degree extensor lag passively correctable to 0 degrees.\par \par The central slip appears to be intact there is no tenderness of the PIP and DIP joint but the digit is in a swan-neck deformity.  Upon correcting the hyperextension of the PIP joint the DIP motion does not improve.\par \par Range of motion 0/90 of the MP 0/80 the PIP 90/100 of the DIP joint passively correctable to 0 degrees without evidence of instability\par \par Good capillary refill negative Tinel's sensation grossly intact [de-identified] : PA lateral and oblique of the right fifth digit shows an extensor lag of the right fifth DIP joint with the joints congruent no evidence of fractures or dislocations

## 2022-09-12 NOTE — ASSESSMENT
[FreeTextEntry1] : 4 days status post right fifth mallet finger with PIP stiffness secondary to some splinting.\par \par The importance of moving the PIP joint to reduce the risk of stiffness was discussed.\par \par Options were discussed ranging from conservative management to surgical intervention.\par \par Importance of compliance was discussed.\par \par Elected to proceed with a mallet type splint to maintain extension of the DIP joint and a home program outlined.\par \par Patient will follow-up with a therapist for splint adjustments\par \par Patient has a physical job and states he is unable to lift with the hand with a splint on.\par \par He is cleared for light duty with no lifting with the right upper extremity but if this is not available he will remain out of work.\par \par Return to the office in 6 weeks or earlier if there are issues or concerns discussed

## 2022-09-12 NOTE — HISTORY OF PRESENT ILLNESS
[Has the patient missed work because of the injury/illness?] : The patient has missed work because of the injury/illness. [No] : The patient is currently not working. [FreeTextEntry1] : DOI-9/8/22\par 4 days s/p right 5th digit injury sustained while moving a stage weight. He was treated at urgent care where Xrays were done showing no evidence of a fracture. Patient was placed in a splint. He complains of pain at the DIP joint. [FreeTextEntry2] :  [FreeTextEntry6] : Lifting scenery, moving trusses, concert activities [FreeTextEntry3] : Lifting heavy objects, pulling rope. [FreeTextEntry5] : none

## 2022-10-24 ENCOUNTER — APPOINTMENT (OUTPATIENT)
Dept: ORTHOPEDIC SURGERY | Facility: CLINIC | Age: 27
End: 2022-10-24

## 2022-10-24 VITALS — WEIGHT: 174 LBS | HEIGHT: 70 IN | RESPIRATION RATE: 16 BRPM | BODY MASS INDEX: 24.91 KG/M2

## 2022-10-24 PROCEDURE — 99213 OFFICE O/P EST LOW 20 MIN: CPT

## 2022-10-24 PROCEDURE — 99072 ADDL SUPL MATRL&STAF TM PHE: CPT

## 2022-10-24 NOTE — ASSESSMENT
[FreeTextEntry1] : 6 weeks status post immobilization of a right fourth mallet finger with a splint which appears to be fitting him well.  There is a residual 20 degree extensor lag.\par \par The risks, benefits, alternatives and associated differential diagnosis was discussed with the patient. Options ranged from conservative care, therapy to surgical intervention were reviewed and all questions answered. Risks included incomplete resolution of symptoms, worsening of symptoms recurrence, tissue loss, functional loss and other risks associated with treatment of this condition Patient appeared to have an excellent understanding of the risks as well as differential diagnosis associated with this condition.\par \par Elected to continue splint immobilization to correct a 20 degree extensor lag\par \par Return to the office in 3 weeks.\par \par Follow-up with therapy whenever splint feels that it is loose or not maintaining extension.\par \par He states there is no light duty available and he cannot work while wearing a splint.  It is for this reason we will continue his out of work status for an additional 3 weeks and reassess at that time

## 2022-10-24 NOTE — REASON FOR VISIT
[Follow-Up Visit] : a follow-up visit for [Workers' Comp: Date of Injury: _______] : This visit is related to worker's compensation. Date of Injury: [unfilled] [Hand Injury] : hand injury

## 2022-10-24 NOTE — HISTORY OF PRESENT ILLNESS
[Right] : right hand dominant [Has the patient missed work because of the injury/illness?] : The patient has missed work because of the injury/illness. [No] : The patient is currently not working. [FreeTextEntry1] : Patient here for follow-up 6 weeks status post splinting of right fifth soft tissue mallet finger.  Patient has been compliant with the splint and has been getting adjustments as needed.  Patient has been compliant with the splint. [FreeTextEntry2] :  [FreeTextEntry4] : Mallet splint [FreeTextEntry6] : 9/8/22

## 2022-10-24 NOTE — PHYSICAL EXAM
[de-identified] : Upon removal of the splint there is a 20 degree extensor lag passively correctable to 0 degrees but full range of motion of the MP and PIP joint and flexor mechanism intact.

## 2022-11-21 ENCOUNTER — APPOINTMENT (OUTPATIENT)
Dept: ORTHOPEDIC SURGERY | Facility: CLINIC | Age: 27
End: 2022-11-21

## 2022-11-21 PROCEDURE — 99072 ADDL SUPL MATRL&STAF TM PHE: CPT

## 2022-11-21 PROCEDURE — 99213 OFFICE O/P EST LOW 20 MIN: CPT

## 2022-12-13 ENCOUNTER — TRANSCRIPTION ENCOUNTER (OUTPATIENT)
Age: 27
End: 2022-12-13

## 2022-12-13 NOTE — ASU PATIENT PROFILE, ADULT - FALL HARM RISK - UNIVERSAL INTERVENTIONS
Bed in lowest position, wheels locked, appropriate side rails in place/Call bell, personal items and telephone in reach/Instruct patient to call for assistance before getting out of bed or chair/Non-slip footwear when patient is out of bed/[place] to call system/Physically safe environment - no spills, clutter or unnecessary equipment/Purposeful Proactive Rounding/Room/bathroom lighting operational, light cord in reach

## 2022-12-14 ENCOUNTER — TRANSCRIPTION ENCOUNTER (OUTPATIENT)
Age: 27
End: 2022-12-14

## 2022-12-14 ENCOUNTER — OUTPATIENT (OUTPATIENT)
Dept: OUTPATIENT SERVICES | Facility: HOSPITAL | Age: 27
LOS: 1 days | Discharge: HOME | End: 2022-12-14
Payer: COMMERCIAL

## 2022-12-14 VITALS
TEMPERATURE: 98 F | RESPIRATION RATE: 16 BRPM | HEIGHT: 71 IN | HEART RATE: 65 BPM | DIASTOLIC BLOOD PRESSURE: 73 MMHG | SYSTOLIC BLOOD PRESSURE: 118 MMHG | WEIGHT: 172.4 LBS

## 2022-12-14 VITALS
HEART RATE: 62 BPM | OXYGEN SATURATION: 98 % | RESPIRATION RATE: 14 BRPM | SYSTOLIC BLOOD PRESSURE: 111 MMHG | DIASTOLIC BLOOD PRESSURE: 75 MMHG

## 2022-12-14 PROCEDURE — 88305 TISSUE EXAM BY PATHOLOGIST: CPT | Mod: 26

## 2022-12-14 RX ORDER — OXYCODONE HYDROCHLORIDE 5 MG/1
5 TABLET ORAL ONCE
Refills: 0 | Status: DISCONTINUED | OUTPATIENT
Start: 2022-12-14 | End: 2022-12-14

## 2022-12-14 RX ORDER — HYDROMORPHONE HYDROCHLORIDE 2 MG/ML
0.5 INJECTION INTRAMUSCULAR; INTRAVENOUS; SUBCUTANEOUS ONCE
Refills: 0 | Status: DISCONTINUED | OUTPATIENT
Start: 2022-12-14 | End: 2022-12-14

## 2022-12-14 RX ADMIN — OXYCODONE HYDROCHLORIDE 5 MILLIGRAM(S): 5 TABLET ORAL at 19:52

## 2022-12-14 RX ADMIN — OXYCODONE HYDROCHLORIDE 5 MILLIGRAM(S): 5 TABLET ORAL at 19:22

## 2022-12-14 NOTE — ASU DISCHARGE PLAN (ADULT/PEDIATRIC) - CARE PROVIDER_API CALL
Christine Hernandez)  Urology  [address] [place] [address]  Phone: [phone]  Fax: [phone]  Follow Up Time:

## 2022-12-14 NOTE — BRIEF OPERATIVE NOTE - OPERATION/FINDINGS
Multiple rubbery nodular lesions of R and mid scrotum (>10), locally excised and sent for analysis; skin reapproximated in 2-layer closure

## 2022-12-14 NOTE — ASU DISCHARGE PLAN (ADULT/PEDIATRIC) - HISTORY OF COVID-19 VACCINATION
[FreeTextEntry1] : 63 yo male with hx of t2dm uncontrolled (HbA1C 7.3%) with hx of CVA here for f/u.\par 1) T2DM: Continue trulicity 1.5mg po sq qweekly, metformin to 850mg po bid, and Farxiga 10mg po qdaily. His Hba1c was 9.3% 2 months ago when hospitalized with COVID19.\par Test daily.\par Last saw optho 2/2020 - no retinopathy. Recieved report. \par 2) Hx of COVID19: Pt already had COVID, reviewed with him the need to continue to wear a mask. Reviewed notes from [facility] hospitalization. \par 3) HTN: BP slightly above goal at 140/82. Will not change meds as he is due for hiis mid-day hydralzaine dose. \par Continue Norvasc/hydralazine/labetalol/lisinopril. \par 4) Dyslipidemia: continue statin therapy.\par Yearly labs at next visit\par Return in 3-4 months.
Yes

## 2022-12-14 NOTE — ASU PREOP CHECKLIST - HEIGHT IN CM
180.34 Purse String (Intermediate) Text: Given the location of the defect and the characteristics of the surrounding skin a purse string intermediate closure was deemed most appropriate.  Undermining was performed circumfirentially around the surgical defect.  A purse string suture was then placed and tightened.

## 2022-12-14 NOTE — ASU DISCHARGE PLAN (ADULT/PEDIATRIC) - NS MD DC FALL RISK RISK
For information on Fall & Injury Prevention, visit: [email or web address] OR  [email or web address] OR  https://www.cdc.gov/steadi/patient.html

## 2022-12-21 LAB — SURGICAL PATHOLOGY STUDY: SIGNIFICANT CHANGE UP

## 2022-12-28 ENCOUNTER — APPOINTMENT (OUTPATIENT)
Dept: ORTHOPEDIC SURGERY | Facility: CLINIC | Age: 27
End: 2022-12-28

## 2022-12-28 VITALS — RESPIRATION RATE: 16 BRPM | HEIGHT: 70 IN | WEIGHT: 174 LBS | BODY MASS INDEX: 24.91 KG/M2

## 2022-12-28 DIAGNOSIS — S69.91XD UNSPECIFIED INJURY OF RIGHT WRIST, HAND AND FINGER(S), SUBSEQUENT ENCOUNTER: ICD-10-CM

## 2022-12-28 PROCEDURE — 99072 ADDL SUPL MATRL&STAF TM PHE: CPT

## 2022-12-28 PROCEDURE — 99213 OFFICE O/P EST LOW 20 MIN: CPT

## 2022-12-28 NOTE — HISTORY OF PRESENT ILLNESS
[Right] : right hand dominant [Has the patient missed work because of the injury/illness?] : The patient has missed work because of the injury/illness. [No] : The patient is currently not working. [FreeTextEntry1] : Patient here 3 and half months status post right fourth soft tissue mallet finger.  Patient immobilized the mallet finger for 10 weeks and then started the mallet protocol.  Patient is just splinting at night. [FreeTextEntry2] :  [FreeTextEntry6] : 9/8/22

## 2022-12-28 NOTE — PHYSICAL EXAM
[de-identified] : There is thickening over the ulnar collateral ligament PIP joint but no evidence of instability.\par \par Range of motion 0/90 the MP 0/110 of the PIP and 0/60 of the DIP joint with active equaling passive range of motion but soft endpoint.\par \par  strength 70 pounds on the right and 110 pounds on the left\par \par There is good capillary refill of the digits bilaterally.There is no masses or sensitivity over the median and ulnar nerves at the level of the wrist. There is a negative Tinel's and negative Phalen's sign bilaterally. The sensation is grossly intact bilaterally.\par \par

## 2023-03-13 ENCOUNTER — APPOINTMENT (OUTPATIENT)
Dept: ORTHOPEDIC SURGERY | Facility: CLINIC | Age: 28
End: 2023-03-13

## 2023-05-29 ENCOUNTER — NON-APPOINTMENT (OUTPATIENT)
Age: 28
End: 2023-05-29

## 2024-02-17 NOTE — ASU PATIENT PROFILE, ADULT - NS PREOP UNDERSTANDS INFO
spoke to patient to be NPO after 5 am tomorrow , no solid foods no smoking no alcohol , dress comfortable, leave valuable at home, bring ID photo, insurance and vaccination cards, address and telephone given to patient/yes 98.2

## 2024-05-10 NOTE — PRE-ANESTHESIA EVALUATION ADULT - HEIGHT IN INCHES
[] : Fellow [FreeTextEntry3] : as above, moisture control is paramount need to change dressings, reapply cream once saturated  [Time Spent: ___ minutes] : I have spent [unfilled] minutes of time on the encounter. 11

## 2024-10-02 NOTE — PRE-ANESTHESIA EVALUATION ADULT - NSANTHOSAYNRD_GEN_A_CORE
No. ENID screening performed.  STOP BANG Legend: 0-2 = LOW Risk; 3-4 = INTERMEDIATE Risk; 5-8 = HIGH Risk
4

## (undated) DEVICE — PACK GENERAL MINOR

## (undated) DEVICE — DRAIN PENROSE .25" X 18" LATEX

## (undated) DEVICE — DRSG KERLIX ROLL 4.5"

## (undated) DEVICE — SUT CHROMIC 4-0 27" RB-1

## (undated) DEVICE — SLV COMPRESSION KNEE MED

## (undated) DEVICE — GLV 7.5 PROTEXIS (WHITE)

## (undated) DEVICE — SUCTION YANKAUER NO CONTROL VENT

## (undated) DEVICE — MARKING PEN W RULER

## (undated) DEVICE — SUPP ATHLETIC MALE XLG 44-55IN

## (undated) DEVICE — TUBING SUCTION NONCONDUCTIVE 6MM X 12FT

## (undated) DEVICE — SUT VICRYL 3-0 18" SH UNDYED (POP-OFF)

## (undated) DEVICE — WARMING BLANKET UPPER ADULT